# Patient Record
Sex: FEMALE | Race: WHITE | NOT HISPANIC OR LATINO | Employment: OTHER | URBAN - METROPOLITAN AREA
[De-identification: names, ages, dates, MRNs, and addresses within clinical notes are randomized per-mention and may not be internally consistent; named-entity substitution may affect disease eponyms.]

---

## 2017-01-23 RX ORDER — METOPROLOL SUCCINATE 100 MG/1
100 TABLET, EXTENDED RELEASE ORAL DAILY
Qty: 30 TABLET | Refills: 11 | Status: SHIPPED | OUTPATIENT
Start: 2017-01-23 | End: 2017-08-02 | Stop reason: SDUPTHER

## 2017-08-02 ENCOUNTER — OFFICE VISIT (OUTPATIENT)
Dept: CARDIOLOGY | Facility: CLINIC | Age: 72
End: 2017-08-02

## 2017-08-02 VITALS
HEIGHT: 62 IN | WEIGHT: 168.8 LBS | HEART RATE: 64 BPM | BODY MASS INDEX: 31.06 KG/M2 | SYSTOLIC BLOOD PRESSURE: 124 MMHG | DIASTOLIC BLOOD PRESSURE: 80 MMHG

## 2017-08-02 DIAGNOSIS — I10 ESSENTIAL HYPERTENSION: ICD-10-CM

## 2017-08-02 DIAGNOSIS — I47.1 SVT (SUPRAVENTRICULAR TACHYCARDIA) (HCC): Primary | ICD-10-CM

## 2017-08-02 PROCEDURE — 99213 OFFICE O/P EST LOW 20 MIN: CPT | Performed by: INTERNAL MEDICINE

## 2017-08-02 RX ORDER — METOPROLOL SUCCINATE 100 MG/1
100 TABLET, EXTENDED RELEASE ORAL DAILY
Qty: 90 TABLET | Refills: 3 | Status: SHIPPED | OUTPATIENT
Start: 2017-08-02 | End: 2018-08-09 | Stop reason: SDUPTHER

## 2017-08-02 NOTE — PROGRESS NOTES
Ivon Damon  1945  [phone]      08/02/2017    [facility] CARDIOLOGY     Bri Maria, DO  [address] [place] [address]    Chief Complaint   Patient presents with   • Rapid Heart Rate       Problem List:   PROBLEM LIST:  1. SVT:   a. Long-standing history of tachypalpitations.  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   b. Multiple ER visits  with successful cardioversion with adenosine.    Bilateral carotid duplex 3/14/2016: No evidence of stenosis in the right common carotid artery, no hemodynamically significant stenosis in the right internal carotid artery, no evidence of stenosis in the left common carotid artery and no hemodynamically significant stenosis in the left internal carotid artery.  -Echocardiogram 3/14/2016: EF 60-65%, mild aortic cusp sclerosis with no valvular disease noted  -SVT episode 4/2016 ER lasting   Treated with Adenosine at [facility]. HR was 200.      2. HTN  3. Graves’  disease:  a. Status post radiation therapy.  b. Chronic replacement.   4. History of nephrolithiasis.   5. Surgical history:  Partial hysterectomy, 1975.     Allergies  Allergies   Allergen Reactions   • Penicillins    • Sulfa Antibiotics        Current Medications    Current Outpatient Prescriptions:   •  levothyroxine (SYNTHROID, LEVOTHROID) 125 MCG tablet, Take 125 mcg by mouth daily., Disp: , Rfl:   •  metoprolol succinate XL (TOPROL-XL) 100 MG 24 hr tablet, Take 1 tablet by mouth Daily., Disp: 30 tablet, Rfl: 11    History of Present Illness   HPI    Pt presents for follow up of SVT . Since we last saw the pt, pt denies any SVT episodes, SOB, CP, LH, and dizziness. Denies any hospitalizations, ER visits, bleeding, or TIA/CVA symptoms. Overall feels well. BP stable at home.    ROS:  General:  Denies fatigue, weight gain or loss  Cardiovascular:  Denies CP, PND, syncope, near syncope, edema or palpitations.  Pulmonary:  Denies WHITESIDE, cough, or  "wheezing      Vitals:    08/02/17 1128   BP: 124/80   BP Location: Left arm   Patient Position: Sitting   Pulse: 64   Weight: 168 lb 12.8 oz (76.6 kg)   Height: 62\" (157.5 cm)     PE:  General: NAD  Neck: no JVD, no carotid bruits, no TM  Heart RRR, NL S1, S2, S4 present, no rubs, murmurs  Lungs: CTA, no wheezes, rhonchi, or rales  Abd: soft, non-tender, NL BS  Ext: No musculoskeletal deformities, no edema, cyanosis, or clubbing  Psych: normal mood and affect    Diagnostic Data:      Procedures    1. SVT (supraventricular tachycardia)    2. Essential hypertension          Plan:  1) SVT: no recurrence on BBL  Continue present medications.   2) HTN  Wt loss, exercise, salt reduction    F/up in 12 months      "

## 2018-06-27 ENCOUNTER — TELEPHONE (OUTPATIENT)
Dept: CARDIOLOGY | Facility: CLINIC | Age: 73
End: 2018-06-27

## 2018-06-27 DIAGNOSIS — I47.1 SVT (SUPRAVENTRICULAR TACHYCARDIA) (HCC): Primary | ICD-10-CM

## 2018-06-27 NOTE — TELEPHONE ENCOUNTER
"Patient is complainig of \"skipped beats\" and feels like they are stronger and more noticable than they normally have been. She is not complaining of any other symptoms at this time. She wants to know if you want to see her sooner than her appointment in August. Thank you  "

## 2018-07-09 ENCOUNTER — TELEPHONE (OUTPATIENT)
Dept: CARDIOLOGY | Facility: CLINIC | Age: 73
End: 2018-07-09

## 2018-07-10 ENCOUNTER — TELEPHONE (OUTPATIENT)
Dept: CARDIOLOGY | Facility: CLINIC | Age: 73
End: 2018-07-10

## 2018-07-25 ENCOUNTER — TELEPHONE (OUTPATIENT)
Dept: CARDIOLOGY | Facility: CLINIC | Age: 73
End: 2018-07-25

## 2018-07-25 NOTE — TELEPHONE ENCOUNTER
Called pt per María Elena and GFT to let her know rare PAC's and extra beats. Nothing to treat at this time. No answer left voicemail to call the office back.

## 2018-08-09 RX ORDER — METOPROLOL SUCCINATE 100 MG/1
TABLET, EXTENDED RELEASE ORAL
Qty: 30 TABLET | Refills: 11 | Status: SHIPPED | OUTPATIENT
Start: 2018-08-09 | End: 2019-08-23 | Stop reason: SDUPTHER

## 2018-08-29 ENCOUNTER — OFFICE VISIT (OUTPATIENT)
Dept: CARDIOLOGY | Facility: CLINIC | Age: 73
End: 2018-08-29

## 2018-08-29 VITALS
HEART RATE: 72 BPM | WEIGHT: 173 LBS | DIASTOLIC BLOOD PRESSURE: 92 MMHG | BODY MASS INDEX: 31.83 KG/M2 | SYSTOLIC BLOOD PRESSURE: 140 MMHG | HEIGHT: 62 IN | OXYGEN SATURATION: 98 %

## 2018-08-29 DIAGNOSIS — I10 ESSENTIAL HYPERTENSION: ICD-10-CM

## 2018-08-29 DIAGNOSIS — I49.1 PAC (PREMATURE ATRIAL CONTRACTION): Primary | ICD-10-CM

## 2018-08-29 DIAGNOSIS — I47.1 SVT (SUPRAVENTRICULAR TACHYCARDIA) (HCC): ICD-10-CM

## 2018-08-29 DIAGNOSIS — I49.3 PVC (PREMATURE VENTRICULAR CONTRACTION): ICD-10-CM

## 2018-08-29 PROCEDURE — 99213 OFFICE O/P EST LOW 20 MIN: CPT | Performed by: INTERNAL MEDICINE

## 2018-08-29 RX ORDER — LEVOTHYROXINE SODIUM 112 UG/1
112 TABLET ORAL DAILY
COMMUNITY

## 2018-08-29 RX ORDER — BACLOFEN 10 MG/1
10 TABLET ORAL AS NEEDED
COMMUNITY
Start: 2018-06-18 | End: 2019-09-30

## 2018-08-29 NOTE — PROGRESS NOTES
Ivon Damon  1945    There is no work phone number on file.    08/29/2018    [facility] CARDIOLOGY     Perman, MD Nery  [address] [place] [address]    Chief Complaint   Patient presents with   • SVT (supraventricular tachycardia)     PROBLEM LIST:  1. SVT:   a. Long-standing history of tachypalpitations.  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   b. Multiple ER visits  with successful cardioversion with adenosine.   c. SVT episode 4/2016 ER lasting   Treated with Adenosine at [facility]. HR was 200.        d. Echocardiogram 3/14/2016: EF 60-65%, mild aortic cusp sclerosis with no valvular disease noted    e. Bilateral carotid duplex 3/14/2016: No evidence of stenosis in the right common carotid artery, no hemodynamically significant stenosis in the right internal carotid artery, no evidence of stenosis in the left common carotid artery and no hemodynamically significant stenosis in the left internal carotid artery.  -  f. ER 7/18: NSAT, PVC/PAC    -      2. HTN  3. Graves’  disease:  a. Status post radiation therapy.  b. Chronic replacement.   4. History of nephrolithiasis.   5. Surgical history:  Partial hysterectomy, 1975.    Allergies  Allergies   Allergen Reactions   • Penicillins    • Sulfa Antibiotics        Current Medications    Current Outpatient Prescriptions:   •  baclofen (LIORESAL) 10 MG tablet, Take 10 mg by mouth As Needed., Disp: , Rfl:   •  levothyroxine (SYNTHROID) 112 MCG tablet, Take 112 mcg by mouth Daily., Disp: , Rfl:   •  metoprolol succinate XL (TOPROL-XL) 100 MG 24 hr tablet, TAKE ONE TABLET BY MOUTH DAILY, Disp: 30 tablet, Rfl: 11    History of Present Illness   HPI    Pt presents for follow up of SVT. Since we last saw the pt, pt with palps in July lasting seconds noticed at rest better with exertion. ER c/w PVC, PAC, NSAT. Now palps better. Denies any SVT episodes, SOB, CP, LH, and dizziness. Denies any hospitalizations, ER visits,  "bleeding, or TIA/CVA symptoms. Overall feels well. Awaiting routine colonscopy. Does not check BP at home    ROS:  General:  + mild fatigue, No weight gain or loss  Cardiovascular:  Denies CP, PND, syncope, near syncope, edema or palpitations.  Pulmonary:  Denies WHITESIDE, cough, or wheezing      Vitals:    08/29/18 0852   BP: 140/92   BP Location: Right arm   Patient Position: Sitting   Pulse: 72   SpO2: 98%   Weight: 78.5 kg (173 lb)   Height: 157.5 cm (62\")     Body mass index is 31.64 kg/m².  PE:  General: NAD  Neck: no JVD, no carotid bruits, no TM  Heart RRR, NL S1, S2, no rubs, murmurs  Lungs: CTA, no wheezes, rhonchi, or rales  Abd: soft, non-tender, NL BS  Ext: No musculoskeletal deformities, no edema, cyanosis, or clubbing  Psych: normal mood and affect    Diagnostic Data:      Procedures    1. PAC (premature atrial contraction)    2. PVC (premature ventricular contraction)    3. SVT (supraventricular tachycardia) (CMS/HCC)    4. Essential hypertension          Plan:  1) PAC/PVC: improved recently: monitor  2) SVT: no recurrence on BBL  Continue present medications.   3) HTN: would monitor at home  Wt loss, exercise, salt reduction     F/up in 12 months          "

## 2019-08-23 RX ORDER — METOPROLOL SUCCINATE 100 MG/1
100 TABLET, EXTENDED RELEASE ORAL DAILY
Qty: 30 TABLET | Refills: 11 | Status: SHIPPED | OUTPATIENT
Start: 2019-08-23 | End: 2020-07-02

## 2019-09-30 ENCOUNTER — OFFICE VISIT (OUTPATIENT)
Dept: CARDIOLOGY | Facility: CLINIC | Age: 74
End: 2019-09-30

## 2019-09-30 VITALS
HEART RATE: 80 BPM | SYSTOLIC BLOOD PRESSURE: 122 MMHG | DIASTOLIC BLOOD PRESSURE: 78 MMHG | BODY MASS INDEX: 36.91 KG/M2 | WEIGHT: 200.6 LBS | OXYGEN SATURATION: 96 % | HEIGHT: 62 IN

## 2019-09-30 DIAGNOSIS — I10 ESSENTIAL HYPERTENSION: ICD-10-CM

## 2019-09-30 DIAGNOSIS — I47.1 SVT (SUPRAVENTRICULAR TACHYCARDIA) (HCC): Primary | ICD-10-CM

## 2019-09-30 PROCEDURE — 99213 OFFICE O/P EST LOW 20 MIN: CPT | Performed by: PHYSICIAN ASSISTANT

## 2019-09-30 NOTE — PROGRESS NOTES
Ivon Damon  1945  PCP: Josue Vieyra MD    SUBJECTIVE:   Ivon Damon is a 74 y.o. female seen for a follow up visit regarding the following:     Chief Complaint: Follow up for SVT     HPI:    Since last visit the patient's status has been stable. She has had no recurrence of SVT. She only feels occasional hard beats that are very minimal. Overall stable course. No cp, sob, edema.     PROBLEM LIST:  1. SVT:   a. Long-standing history of tachypalpitations.  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   b. Multiple ER visits  with successful cardioversion with adenosine.   c. SVT episode 4/2016 ER lasting   Treated with Adenosine at [facility]. HR was 200.        d. Echocardiogram 3/14/2016: EF 60-65%, mild aortic cusp sclerosis with no valvular disease noted    e. Bilateral carotid duplex 3/14/2016: No evidence of stenosis in the right common carotid artery, no hemodynamically significant stenosis in the right internal carotid artery, no evidence of stenosis in the left common carotid artery and no hemodynamically significant stenosis in the left internal carotid artery.  -  f. ER 7/18: NSAT, PVC/PAC     -      2. HTN  3. Graves’  disease:  a. Status post radiation therapy.  b. Chronic replacement.   4. History of nephrolithiasis.   5. Surgical history:  Partial hysterectomy, 1975.        Current Outpatient Medications:   •  levothyroxine (SYNTHROID) 112 MCG tablet, Take 112 mcg by mouth Daily., Disp: , Rfl:   •  metoprolol succinate XL (TOPROL-XL) 100 MG 24 hr tablet, Take 1 tablet by mouth Daily., Disp: 30 tablet, Rfl: 11    Past Medical History, Past Surgical History, Family history, Social History, and Medications were all reviewed with the patient today and updated as necessary.       Patient Active Problem List   Diagnosis   • Hypertension   • Hyperlipidemia   • SVT (supraventricular tachycardia) (CMS/HCC)   • Graves disease     Allergies   Allergen Reactions   • Penicillins    • Sulfa Antibiotics   "    Past Medical History:   Diagnosis Date   • Abnormal gait    • Carpal tunnel syndrome    • Recurrent falls     x2    • S/P radiation therapy    • Vitamin D deficiency      Past Surgical History:   Procedure Laterality Date   • HUMERUS FRACTURE SURGERY  12/2015    right    • HYSTERECTOMY      partial      Family History   Problem Relation Age of Onset   • Hypertension Mother    • Cancer Mother         malignant neoplasm    • No Known Problems Father    • Other Sister         Cardiac Arrythmia    • No Known Problems Brother      Social History     Tobacco Use   • Smoking status: Never Smoker   • Smokeless tobacco: Never Used   Substance Use Topics   • Alcohol use: No         PHYSICAL EXAM:    /78 (BP Location: Right arm, Patient Position: Sitting)   Pulse 80   Ht 157.5 cm (62\")   Wt 91 kg (200 lb 9.6 oz)   SpO2 96%   BMI 36.69 kg/m²        Wt Readings from Last 5 Encounters:   09/30/19 91 kg (200 lb 9.6 oz)   08/29/18 78.5 kg (173 lb)   08/02/17 76.6 kg (168 lb 12.8 oz)   07/27/16 87.5 kg (193 lb)   03/14/16 87.2 kg (192 lb 4 oz)       BP Readings from Last 5 Encounters:   09/30/19 122/78   08/29/18 140/92   08/02/17 124/80   07/27/16 130/84   03/14/16 138/78       General-Well Nourished, Well developed  Eyes - PERRLA  Neck- supple, No mass  CV- regular rate and rhythm, no MRG, No edema  Lung- clear bilaterally  Abd- soft, +BS  Musc/skel - Norm strength and range of motion  Skin- warm and dry  Neuro - Alert & Oriented x 3, appropriate mood.        Medical problems and test results were reviewed with the patient today.     No results found for this or any previous visit (from the past 672 hour(s)).      EKG: (EKG has been independently visualized by me and summarized below)    Procedures     ASSESSMENT and PLAN    1) PAC/PVC: well controlled on metoprolol.   2) SVT: no recurrence on metoprolol. If recurrence consider EPS.   3) HTN: controlled on Toprol. Wt loss, exercise, salt reduction      Return in " about 1 year (around 9/30/2020).        Blossom Kearns PA-C   Cardiology/Electrophysiology  9/30/2019  2:54 PM

## 2020-07-02 RX ORDER — METOPROLOL SUCCINATE 100 MG/1
TABLET, EXTENDED RELEASE ORAL
Qty: 90 TABLET | Refills: 3 | Status: SHIPPED | OUTPATIENT
Start: 2020-07-02 | End: 2021-06-25

## 2020-11-05 ENCOUNTER — OFFICE VISIT (OUTPATIENT)
Dept: CARDIOLOGY | Facility: CLINIC | Age: 75
End: 2020-11-05

## 2020-11-05 VITALS — HEART RATE: 60 BPM | BODY MASS INDEX: 27.6 KG/M2 | WEIGHT: 150 LBS | HEIGHT: 62 IN

## 2020-11-05 DIAGNOSIS — I47.1 SVT (SUPRAVENTRICULAR TACHYCARDIA) (HCC): Primary | ICD-10-CM

## 2020-11-05 DIAGNOSIS — I10 ESSENTIAL HYPERTENSION: ICD-10-CM

## 2020-11-05 PROCEDURE — 99442 PR PHYS/QHP TELEPHONE EVALUATION 11-20 MIN: CPT | Performed by: PHYSICIAN ASSISTANT

## 2020-11-05 RX ORDER — OMEPRAZOLE 20 MG/1
20 CAPSULE, DELAYED RELEASE ORAL 2 TIMES DAILY
COMMUNITY
End: 2021-12-15

## 2020-11-05 NOTE — PROGRESS NOTES
Ivon Damon  1945  [phone]    11/05/2020    [facility] CARDIOLOGY     Usery, Josue Sanchez MD  [address] [place] [address]    Chief Complaint   Patient presents with   • Rapid Heart Rate       Problem List:     1. SVT:   a. Long-standing history of tachypalpitations.  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   b. Multiple ER visits  with successful cardioversion with adenosine.   c. SVT episode 4/2016 ER lasting   Treated with Adenosine at [facility]. HR was 200.        d. Echocardiogram 3/14/2016: EF 60-65%, mild aortic cusp sclerosis with no valvular disease noted    e. Bilateral carotid duplex 3/14/2016: No evidence of stenosis in the right common carotid artery, no hemodynamically significant stenosis in the right internal carotid artery, no evidence of stenosis in the left common carotid artery and no hemodynamically significant stenosis in the left internal carotid artery.  -  f. ER 7/18: NSAT, PVC/PAC      2. HTN  3. Graves’  disease:  a. Status post radiation therapy.  b. Chronic replacement.   4. History of nephrolithiasis.   5. Surgical history:  Partial hysterectomy, 1975.  6. GERD  7. Basal Cell Carcinoma removal       Allergies  Allergies   Allergen Reactions   • Penicillins    • Sulfa Antibiotics        Current Medications    Current Outpatient Medications:   •  levothyroxine (SYNTHROID) 112 MCG tablet, Take 112 mcg by mouth Daily., Disp: , Rfl:   •  metoprolol succinate XL (TOPROL-XL) 100 MG 24 hr tablet, TAKE ONE TABLET BY MOUTH DAILY, Disp: 90 tablet, Rfl: 3  •  omeprazole (priLOSEC) 20 MG capsule, Take 20 mg by mouth 2 (two) times a day., Disp: , Rfl:     History of Present Illness     Pt presents for follow up of SVT. Since we last saw the pt, she denies any episodes of SVT. She denies SOB, CP, LH, and dizziness. Denies any hospitalizations, ER visits, bleeding issues, or TIA/CVA symptoms. Overall feels well. Had recent colonoscopy and endoscopy due  "to GERD. She had polyp removed. She is on PPI now for GERD. She does not have a BP cuff, but has been to her PCP and told her BP is normal.     ROS:  General:  Denies fatigue, weight gain or loss  Cardiovascular:  Denies CP, PND, syncope, near syncope, edema or palpitations.  Pulmonary:  Denies WHITESIDE, cough, or wheezing      Vitals:    11/05/20 1501   Pulse: 60   Weight: 68 kg (150 lb)   Height: 157.5 cm (62\")     Body mass index is 27.44 kg/m².  PE:  Not performed    Diagnostic Data:      Procedures    1. SVT (supraventricular tachycardia) (CMS/HCC)    2. Essential hypertension          Plan:  1. SVT:  - no recurrences. If recurrence, consider EPS    2. HTN:  - will check BP at PCP next visit.     F/up in 12 months    This patient has consented to a telehealth visit via telephone. The visit was scheduled as a telephone visit to comply with patient safety concerns in accordance with CDC recommendations.  All vitals recorded within this visit are reported by the patient.  I spent 7 minutes in total including but not limited to the 5 minutes spent in direct conversation with this patient.     Electronically signed by LIYAH Macias, 11/05/20, 3:14 PM EST.    "

## 2020-12-04 ENCOUNTER — TELEPHONE (OUTPATIENT)
Dept: CARDIOLOGY | Facility: CLINIC | Age: 75
End: 2020-12-04

## 2020-12-04 NOTE — TELEPHONE ENCOUNTER
Patient called.  Patient had a telehealth visit with you on 11/05/2020.  Patient saw her PCP on 11/10/2020.  Ms. Damon wanted to report to you her B/P was 120/80 at that visit.

## 2021-06-25 RX ORDER — METOPROLOL SUCCINATE 100 MG/1
TABLET, EXTENDED RELEASE ORAL
Qty: 90 TABLET | Refills: 2 | Status: SHIPPED | OUTPATIENT
Start: 2021-06-25 | End: 2022-03-21

## 2021-12-15 ENCOUNTER — OFFICE VISIT (OUTPATIENT)
Dept: CARDIOLOGY | Facility: CLINIC | Age: 76
End: 2021-12-15

## 2021-12-15 VITALS
OXYGEN SATURATION: 97 % | WEIGHT: 160.2 LBS | HEIGHT: 62 IN | SYSTOLIC BLOOD PRESSURE: 130 MMHG | BODY MASS INDEX: 29.48 KG/M2 | HEART RATE: 65 BPM | DIASTOLIC BLOOD PRESSURE: 74 MMHG

## 2021-12-15 DIAGNOSIS — I10 PRIMARY HYPERTENSION: ICD-10-CM

## 2021-12-15 DIAGNOSIS — I47.1 SVT (SUPRAVENTRICULAR TACHYCARDIA) (HCC): Primary | ICD-10-CM

## 2021-12-15 PROCEDURE — 93000 ELECTROCARDIOGRAM COMPLETE: CPT | Performed by: PHYSICIAN ASSISTANT

## 2021-12-15 PROCEDURE — 99213 OFFICE O/P EST LOW 20 MIN: CPT | Performed by: INTERNAL MEDICINE

## 2021-12-15 NOTE — PROGRESS NOTES
Ivon Damon  1945  [phone]    12/15/2021    [facility] CARDIOLOGY     Referring Provider: No ref. provider found     Josue Vieyra MD  [address] [place], [place] [id number]    Chief Complaint   Patient presents with   • Premature Atrial Contraction   • SVT (supraventricular tachycardia)       Problem List:   1. SVT:   a. Long-standing history of tachypalpitations.  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   b. Multiple ER visits  with successful cardioversion with adenosine.   c. SVT episode 4/2016 ER lasting   Treated with Adenosine at [facility]. HR was 200.        d. Echocardiogram 3/14/2016: EF 60-65%, mild aortic cusp sclerosis with no valvular disease noted    e. Bilateral carotid duplex 3/14/2016: No evidence of stenosis in the right common carotid artery, no hemodynamically significant stenosis in the right internal carotid artery, no evidence of stenosis in the left common carotid artery and no hemodynamically significant stenosis in the left internal carotid artery.  f. ER 7/18: NSAT, PVC/PAC  2. HTN  3. GERD  4. Graves’  disease:  a. Status post radiation therapy.  b. Chronic replacement.   5. History of nephrolithiasis.   6. Surgical history:  Partial hysterectomy, 1975.  a. Basal Cell Carcinoma removal     Allergies  Allergies   Allergen Reactions   • Penicillins Unknown - Low Severity   • Sulfa Antibiotics Unknown - Low Severity       Current Medications:     Current Outpatient Medications:   •  levothyroxine (SYNTHROID) 112 MCG tablet, Take 112 mcg by mouth Daily., Disp: , Rfl:   •  metoprolol succinate XL (TOPROL-XL) 100 MG 24 hr tablet, TAKE ONE TABLET BY MOUTH DAILY, Disp: 90 tablet, Rfl: 2    History of Present Illness     Pt presents for follow up of SVT. Since we last saw the pt, she denies any SVT episodes, SOB, CP, LH, and dizziness, syncope. Denies any hospitalizations, ER visits, bleeding issues, or TIA/CVA symptoms. Overall feels well. BP has been  "controlled. Recent bloodwork with PCP revealed slightly elevated TSH and PCP recommended she lower her synthroid.     ROS:  General:  Denies fatigue, weight gain or loss  Cardiovascular:  Denies CP, PND, syncope, near syncope, edema or palpitations.  Pulmonary:  Denies WHITESIDE, cough, or wheezing      Vitals:    12/15/21 1101   BP: 130/74   BP Location: Left arm   Patient Position: Sitting   Cuff Size: Adult   Pulse: 65   SpO2: 97%   Weight: 72.7 kg (160 lb 3.2 oz)   Height: 157.5 cm (62\")     Body mass index is 29.3 kg/m².  PE:  General: NAD  Neck: no JVD, no carotid bruits, no TM  Heart RRR, NL S1, S2, S4 present, no rubs, murmurs  Lungs: CTA, no wheezes, rhonchi, or rales  Abd: soft, non-tender, NL BS  Ext: No musculoskeletal deformities, no edema, cyanosis, or clubbing  Psych: normal mood and affect    Diagnostic Data:        ECG 12 Lead    Date/Time: 12/15/2021 11:13 AM  Performed by: Nallely Giron PA  Authorized by: Nallely Giron PA   Comparison: not compared with previous ECG   Previous ECG: no previous ECG available  Rhythm: sinus rhythm  BPM: 65              1. SVT (supraventricular tachycardia) (HCC)    2. Primary hypertension          Plan:    1. SVT:  - no recurrences. Doing well currently. If recurrence, consider EPS  - agree with decrease in synthroid due to slightly elevated TSH. PCP is watching this.      2. HTN:  - controlled on Metoprolol     F/up in 12 months    Electronically signed by LIYAH Macias, 12/15/21, 11:03 AM EST.    I, Liban Main MD, personally performed the services described in this documentation as scribed by the above named individual in my presence, and it is both accurate and complete.  12/15/2021  11:23 EST    "

## 2022-03-21 RX ORDER — METOPROLOL SUCCINATE 100 MG/1
TABLET, EXTENDED RELEASE ORAL
Qty: 90 TABLET | Refills: 2 | Status: SHIPPED | OUTPATIENT
Start: 2022-03-21 | End: 2022-12-19 | Stop reason: SDUPTHER

## 2022-08-01 ENCOUNTER — TRANSCRIBE ORDERS (OUTPATIENT)
Dept: LAB | Facility: HOSPITAL | Age: 77
End: 2022-08-01

## 2022-08-01 ENCOUNTER — LAB (OUTPATIENT)
Dept: LAB | Facility: HOSPITAL | Age: 77
End: 2022-08-01

## 2022-08-01 DIAGNOSIS — Z00.00 ROUTINE GENERAL MEDICAL EXAMINATION AT A HEALTH CARE FACILITY: Primary | ICD-10-CM

## 2022-08-01 DIAGNOSIS — Z00.00 ROUTINE GENERAL MEDICAL EXAMINATION AT A HEALTH CARE FACILITY: ICD-10-CM

## 2022-08-01 PROCEDURE — 36415 COLL VENOUS BLD VENIPUNCTURE: CPT

## 2022-08-01 PROCEDURE — 84520 ASSAY OF UREA NITROGEN: CPT

## 2022-08-01 PROCEDURE — 82565 ASSAY OF CREATININE: CPT

## 2022-08-02 LAB
BUN SERPL-MCNC: 15 MG/DL (ref 8–23)
CREAT SERPL-MCNC: 0.59 MG/DL (ref 0.57–1)
EGFRCR SERPLBLD CKD-EPI 2021: 94.7 ML/MIN/1.73

## 2022-11-07 ENCOUNTER — TRANSCRIBE ORDERS (OUTPATIENT)
Dept: ADMINISTRATIVE | Facility: HOSPITAL | Age: 77
End: 2022-11-07

## 2022-11-07 DIAGNOSIS — R22.1 LOCALIZED SWELLING, MASS OR LUMP OF NECK: Primary | ICD-10-CM

## 2022-11-21 ENCOUNTER — HOSPITAL ENCOUNTER (OUTPATIENT)
Dept: ULTRASOUND IMAGING | Facility: HOSPITAL | Age: 77
Discharge: HOME | End: 2022-11-21

## 2022-11-21 DIAGNOSIS — R22.1 LOCALIZED SWELLING, MASS OR LUMP OF NECK: ICD-10-CM

## 2022-11-21 PROCEDURE — 76536 US EXAM OF HEAD AND NECK: CPT

## 2022-11-21 RX ORDER — LIDOCAINE HYDROCHLORIDE 10 MG/ML
5 INJECTION, SOLUTION EPIDURAL; INFILTRATION; INTRACAUDAL; PERINEURAL ONCE
Status: DISCONTINUED | OUTPATIENT
Start: 2022-11-21 | End: 2022-11-22 | Stop reason: HOSPADM

## 2022-11-23 LAB — REF LAB TEST METHOD: NORMAL

## 2022-12-19 RX ORDER — METOPROLOL SUCCINATE 100 MG/1
100 TABLET, EXTENDED RELEASE ORAL DAILY
Qty: 30 TABLET | Refills: 0 | Status: SHIPPED | OUTPATIENT
Start: 2022-12-19 | End: 2023-01-16 | Stop reason: SDUPTHER

## 2022-12-21 ENCOUNTER — OFFICE VISIT (OUTPATIENT)
Dept: CARDIOLOGY | Facility: CLINIC | Age: 77
End: 2022-12-21

## 2022-12-21 VITALS
OXYGEN SATURATION: 98 % | DIASTOLIC BLOOD PRESSURE: 72 MMHG | BODY MASS INDEX: 29.96 KG/M2 | HEIGHT: 62 IN | HEART RATE: 65 BPM | WEIGHT: 162.8 LBS | SYSTOLIC BLOOD PRESSURE: 130 MMHG

## 2022-12-21 DIAGNOSIS — R01.1 HEART MURMUR: ICD-10-CM

## 2022-12-21 DIAGNOSIS — I47.1 SVT (SUPRAVENTRICULAR TACHYCARDIA): Primary | ICD-10-CM

## 2022-12-21 DIAGNOSIS — I10 PRIMARY HYPERTENSION: ICD-10-CM

## 2022-12-21 PROCEDURE — 93000 ELECTROCARDIOGRAM COMPLETE: CPT | Performed by: PHYSICIAN ASSISTANT

## 2022-12-21 PROCEDURE — 99213 OFFICE O/P EST LOW 20 MIN: CPT | Performed by: PHYSICIAN ASSISTANT

## 2022-12-21 NOTE — PROGRESS NOTES
Ivon Damon  1945  [phone]    12/21/2022    [facility] CARDIOLOGY MAIN CAMPUS     Referring Provider: No ref. provider found     Carrie Prescott MD  [address] [place], [place] [id number]    Chief Complaint   Patient presents with   • SVT (supraventricular tachycardia   • Hypertension   • Hyperlipidemia       Problem List:   1. SVT:   a. Long-standing history of tachypalpitations.  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   b. Multiple ER visits  with successful cardioversion with adenosine.   c. SVT episode 4/2016 ER lasting   Treated with Adenosine at [facility]. HR was 200.        d. Echocardiogram 3/14/2016: EF 60-65%, mild aortic cusp sclerosis with no valvular disease noted    e. Bilateral carotid duplex 3/14/2016: No evidence of stenosis in the right common carotid artery, no hemodynamically significant stenosis in the right internal carotid artery, no evidence of stenosis in the left common carotid artery and no hemodynamically significant stenosis in the left internal carotid artery.  f. ER 7/18: NSAT, PVC/PAC  2. HTN  3. GERD  4. Graves’  disease:  a. Status post radiation therapy.  b. Chronic replacement.   5. History of nephrolithiasis.   6. Surgical history:  Partial hysterectomy, 1975.  a. Basal Cell Carcinoma removal     Allergies  Allergies   Allergen Reactions   • Penicillins Unknown - Low Severity   • Sulfa Antibiotics Unknown - Low Severity       Current Medications    Current Outpatient Medications:   •  levothyroxine (SYNTHROID, LEVOTHROID) 112 MCG tablet, Take 112 mcg by mouth Daily., Disp: , Rfl:   •  metoprolol succinate XL (TOPROL-XL) 100 MG 24 hr tablet, Take 1 tablet by mouth Daily., Disp: 30 tablet, Rfl: 0    History of Present Illness     Pt presents for follow up of SVT and HTN. Since we last saw the pt, she has not had any prolonged episodes of tachy palpitations. She was found to have a lump on her parotid gland and had negative biopsy. Thyroid is  "well controlled, followed by her PCP. She denies SOB, CP, LH, and dizziness, syncope. Denies any hospitalizations, ER visits, or TIA/CVA symptoms. Overall feels well. BP is well controlled.     ROS:  General:  Denies fatigue, weight gain or loss  Cardiovascular:  Denies CP, PND, syncope, near syncope, edema or palpitations.  Pulmonary:  Denies WHITESIDE, cough, or wheezing      Vitals:    12/21/22 1022   BP: 130/72   BP Location: Left arm   Patient Position: Sitting   Pulse: 65   SpO2: 98%   Weight: 73.8 kg (162 lb 12.8 oz)   Height: 157.5 cm (62\")     Body mass index is 29.78 kg/m².  PE:  General: NAD  Neck: no JVD, no carotid bruits, no TM  Heart RRR, NL S1, S2, S4 present, no rubs, + 1/6 murmur  Lungs: CTA, no wheezes, rhonchi, or rales  Abd: soft, non-tender, NL BS  Ext: No musculoskeletal deformities, no edema, cyanosis, or clubbing  Psych: normal mood and affect    Diagnostic Data:        ECG 12 Lead    Date/Time: 12/21/2022 10:50 AM  Performed by: Nallely Giron PA  Authorized by: Nallely Giron PA   Comparison: compared with previous ECG from 12/15/2021  Similar to previous ECG  Rhythm: sinus rhythm  BPM: 65              1. SVT (supraventricular tachycardia) (HCC)    2. Primary hypertension          Plan:  1. SVT:  - no recurrences. Doing well currently. If recurrence, consider EPS     2. HTN:  - controlled on Metoprolol     3. Murmur:   - check echocardiogram to assess aortic valve as she had aortic sclerosis in 2016 echocardiogram.       F/up in 12 months    Electronically signed by LIYAH Macias, 12/21/22, 10:51 AM EST.    "

## 2023-01-13 ENCOUNTER — TELEPHONE (OUTPATIENT)
Dept: CARDIOLOGY | Facility: CLINIC | Age: 78
End: 2023-01-13
Payer: COMMERCIAL

## 2023-01-16 RX ORDER — METOPROLOL SUCCINATE 100 MG/1
100 TABLET, EXTENDED RELEASE ORAL DAILY
Qty: 30 TABLET | Refills: 11 | Status: SHIPPED | OUTPATIENT
Start: 2023-01-16

## 2023-05-22 ENCOUNTER — TELEPHONE (OUTPATIENT)
Dept: INTERNAL MEDICINE | Facility: CLINIC | Age: 78
End: 2023-05-22
Payer: COMMERCIAL

## 2023-05-22 NOTE — TELEPHONE ENCOUNTER
Caller: Ivon Damon    Relationship: Self    Best call back number: [phone]    What is the best time to reach you: ANYTIME    Who are you requesting to speak with (clinical staff, provider,  specific staff member): CLINICAL STAFF    Do you know the name of the person who called: SELF    What was the call regarding: PATIENT WOULD LIKE TO REESTABLISH CARE WITH DR. JARAMILLO IF POSSIBLE. PATIENT WAS REFFERED BY PATIENT ELBA LANDA.    Do you require a callback: YES

## 2023-07-25 ENCOUNTER — HOSPITAL ENCOUNTER (OUTPATIENT)
Dept: CARDIOLOGY | Facility: HOSPITAL | Age: 78
Discharge: HOME | End: 2023-07-25
Admitting: PHYSICIAN ASSISTANT
Payer: COMMERCIAL

## 2023-07-25 VITALS — HEIGHT: 62 IN | BODY MASS INDEX: 29.94 KG/M2 | WEIGHT: 162.7 LBS

## 2023-07-25 DIAGNOSIS — R01.1 HEART MURMUR: ICD-10-CM

## 2023-07-25 LAB
BH CV ECHO MEAS - AO MAX PG: 19.2 MMHG
BH CV ECHO MEAS - AO MEAN PG: 10.8 MMHG
BH CV ECHO MEAS - AO ROOT DIAM: 3.1 CM
BH CV ECHO MEAS - AO V2 MAX: 219 CM/SEC
BH CV ECHO MEAS - AO V2 VTI: 54.3 CM
BH CV ECHO MEAS - AVA(I,D): 1.63 CM2
BH CV ECHO MEAS - EDV(CUBED): 112.7 ML
BH CV ECHO MEAS - EDV(MOD-SP2): 67 ML
BH CV ECHO MEAS - EDV(MOD-SP4): 77 ML
BH CV ECHO MEAS - EF(MOD-BP): 66 %
BH CV ECHO MEAS - EF(MOD-SP2): 65.7 %
BH CV ECHO MEAS - EF(MOD-SP4): 66.2 %
BH CV ECHO MEAS - ESV(CUBED): 27.3 ML
BH CV ECHO MEAS - ESV(MOD-SP2): 23 ML
BH CV ECHO MEAS - ESV(MOD-SP4): 26 ML
BH CV ECHO MEAS - FS: 37.7 %
BH CV ECHO MEAS - IVS/LVPW: 1.08 CM
BH CV ECHO MEAS - IVSD: 1.04 CM
BH CV ECHO MEAS - LAT PEAK E' VEL: 7.8 CM/SEC
BH CV ECHO MEAS - LV DIASTOLIC VOL/BSA (35-75): 44.1 CM2
BH CV ECHO MEAS - LV MASS(C)D: 172.2 GRAMS
BH CV ECHO MEAS - LV MAX PG: 4.3 MMHG
BH CV ECHO MEAS - LV MEAN PG: 2 MMHG
BH CV ECHO MEAS - LV SYSTOLIC VOL/BSA (12-30): 14.9 CM2
BH CV ECHO MEAS - LV V1 MAX: 103.7 CM/SEC
BH CV ECHO MEAS - LV V1 VTI: 28.2 CM
BH CV ECHO MEAS - LVIDD: 4.8 CM
BH CV ECHO MEAS - LVIDS: 3 CM
BH CV ECHO MEAS - LVOT AREA: 3.1 CM2
BH CV ECHO MEAS - LVOT DIAM: 2 CM
BH CV ECHO MEAS - LVPWD: 0.96 CM
BH CV ECHO MEAS - MED PEAK E' VEL: 7.13 CM/SEC
BH CV ECHO MEAS - MR MAX PG: 40.6 MMHG
BH CV ECHO MEAS - MR MAX VEL: 315.5 CM/SEC
BH CV ECHO MEAS - MR MEAN PG: 27.5 MMHG
BH CV ECHO MEAS - MR MEAN VEL: 241.5 CM/SEC
BH CV ECHO MEAS - MR VTI: 96.9 CM
BH CV ECHO MEAS - MV A MAX VEL: 84.9 CM/SEC
BH CV ECHO MEAS - MV DEC SLOPE: 298 CM/SEC2
BH CV ECHO MEAS - MV DEC TIME: 0.27 MSEC
BH CV ECHO MEAS - MV E MAX VEL: 93.8 CM/SEC
BH CV ECHO MEAS - MV E/A: 1.1
BH CV ECHO MEAS - MV MAX PG: 4.4 MMHG
BH CV ECHO MEAS - MV MEAN PG: 2 MMHG
BH CV ECHO MEAS - MV P1/2T: 98.3 MSEC
BH CV ECHO MEAS - MV V2 VTI: 39.1 CM
BH CV ECHO MEAS - MVA(P1/2T): 2.24 CM2
BH CV ECHO MEAS - MVA(VTI): 2.27 CM2
BH CV ECHO MEAS - PA ACC SLOPE: 583 CM/SEC2
BH CV ECHO MEAS - PA ACC TIME: 0.09 SEC
BH CV ECHO MEAS - RAP SYSTOLE: 3 MMHG
BH CV ECHO MEAS - RVSP: 31.5 MMHG
BH CV ECHO MEAS - SI(MOD-SP2): 25.2 ML/M2
BH CV ECHO MEAS - SI(MOD-SP4): 29.2 ML/M2
BH CV ECHO MEAS - SV(LVOT): 88.6 ML
BH CV ECHO MEAS - SV(MOD-SP2): 44 ML
BH CV ECHO MEAS - SV(MOD-SP4): 51 ML
BH CV ECHO MEAS - TAPSE (>1.6): 2 CM
BH CV ECHO MEAS - TR MAX PG: 28.5 MMHG
BH CV ECHO MEAS - TR MAX VEL: 267 CM/SEC
BH CV ECHO MEASUREMENTS AVERAGE E/E' RATIO: 12.57
BH CV VAS BP LEFT ARM: NORMAL MMHG
BH CV XLRA - RV BASE: 4.1 CM
BH CV XLRA - RV LENGTH: 7 CM
BH CV XLRA - RV MID: 2.2 CM
BH CV XLRA - TDI S': 11.3 CM/SEC
IVRT: 74 MSEC
LEFT ATRIUM VOLUME INDEX: 41.1 ML/M2
LEFT ATRIUM VOLUME: 72 ML
LV EF 2D ECHO EST: 65 %

## 2023-07-25 PROCEDURE — 93306 TTE W/DOPPLER COMPLETE: CPT

## 2023-07-25 PROCEDURE — 93306 TTE W/DOPPLER COMPLETE: CPT | Performed by: INTERNAL MEDICINE

## 2023-07-28 ENCOUNTER — TELEPHONE (OUTPATIENT)
Dept: CARDIOLOGY | Facility: CLINIC | Age: 78
End: 2023-07-28
Payer: COMMERCIAL

## 2023-07-28 NOTE — TELEPHONE ENCOUNTER
I tried to call the patient but she did not answer. I left a message for her to call me back at the office.

## 2023-07-28 NOTE — TELEPHONE ENCOUNTER
----- Message from LIYAH Porter sent at 7/28/2023 10:17 AM EDT -----  I called patient to tell her that her echocardiogram looked good, EF normal, valves are WNL. We can wait on her ZioPatch to clear her for her surgery.

## 2023-08-09 ENCOUNTER — TELEPHONE (OUTPATIENT)
Dept: CARDIOLOGY | Facility: CLINIC | Age: 78
End: 2023-08-09
Payer: COMMERCIAL

## 2023-08-09 NOTE — TELEPHONE ENCOUNTER
Called to discuss holter monitor results and to ask about her symptoms. Left a message. Monitor showed short episodes of nonsustained atrial tachycardia. No afib. She is a low cardiac risk for gallbladder surgery.

## 2023-10-30 ENCOUNTER — PRE-ADMISSION TESTING (OUTPATIENT)
Dept: PREADMISSION TESTING | Facility: HOSPITAL | Age: 78
End: 2023-10-30
Payer: COMMERCIAL

## 2023-10-30 VITALS — WEIGHT: 171.74 LBS | HEIGHT: 62 IN | BODY MASS INDEX: 31.6 KG/M2

## 2023-10-30 LAB
ALBUMIN SERPL-MCNC: 4.3 G/DL (ref 3.5–5.2)
ALBUMIN/GLOB SERPL: 1.7 G/DL
ALP SERPL-CCNC: 83 U/L (ref 39–117)
ALT SERPL W P-5'-P-CCNC: 10 U/L (ref 1–33)
ANION GAP SERPL CALCULATED.3IONS-SCNC: 8 MMOL/L (ref 5–15)
AST SERPL-CCNC: 17 U/L (ref 1–32)
BILIRUB SERPL-MCNC: 0.6 MG/DL (ref 0–1.2)
BUN SERPL-MCNC: 18 MG/DL (ref 8–23)
BUN/CREAT SERPL: 27.7 (ref 7–25)
CALCIUM SPEC-SCNC: 9.2 MG/DL (ref 8.6–10.5)
CHLORIDE SERPL-SCNC: 108 MMOL/L (ref 98–107)
CO2 SERPL-SCNC: 28 MMOL/L (ref 22–29)
CREAT SERPL-MCNC: 0.65 MG/DL (ref 0.57–1)
DEPRECATED RDW RBC AUTO: 40.5 FL (ref 37–54)
EGFRCR SERPLBLD CKD-EPI 2021: 91.4 ML/MIN/1.73
ERYTHROCYTE [DISTWIDTH] IN BLOOD BY AUTOMATED COUNT: 12 % (ref 12.3–15.4)
GLOBULIN UR ELPH-MCNC: 2.6 GM/DL
GLUCOSE SERPL-MCNC: 86 MG/DL (ref 65–99)
HCT VFR BLD AUTO: 42.1 % (ref 34–46.6)
HGB BLD-MCNC: 14 G/DL (ref 12–15.9)
MCH RBC QN AUTO: 30.6 PG (ref 26.6–33)
MCHC RBC AUTO-ENTMCNC: 33.3 G/DL (ref 31.5–35.7)
MCV RBC AUTO: 92.1 FL (ref 79–97)
PLATELET # BLD AUTO: 211 10*3/MM3 (ref 140–450)
PMV BLD AUTO: 9.4 FL (ref 6–12)
POTASSIUM SERPL-SCNC: 4.3 MMOL/L (ref 3.5–5.2)
PROT SERPL-MCNC: 6.9 G/DL (ref 6–8.5)
RBC # BLD AUTO: 4.57 10*6/MM3 (ref 3.77–5.28)
SODIUM SERPL-SCNC: 144 MMOL/L (ref 136–145)
WBC NRBC COR # BLD: 5.26 10*3/MM3 (ref 3.4–10.8)

## 2023-10-30 PROCEDURE — 80053 COMPREHEN METABOLIC PANEL: CPT

## 2023-10-30 PROCEDURE — 93005 ELECTROCARDIOGRAM TRACING: CPT

## 2023-10-30 PROCEDURE — 85027 COMPLETE CBC AUTOMATED: CPT

## 2023-10-30 PROCEDURE — 93010 ELECTROCARDIOGRAM REPORT: CPT | Performed by: INTERNAL MEDICINE

## 2023-10-30 PROCEDURE — 36415 COLL VENOUS BLD VENIPUNCTURE: CPT

## 2023-10-30 NOTE — PAT
An arrival time for procedure was not provided during PAT visit. If patient had any questions or concerns about their arrival time, they were instructed to contact their surgeon/physician.  Additionally, if the patient referred to an arrival time that was acquired from their my chart account, patient was encouraged to verify that time with their surgeon/physician. Arrival times are NOT provided in Pre Admission Testing Department.    Patient viewed general PAT education video as instructed in their preoperative information received from their surgeon.  Patient stated the general PAT education video was viewed in its entirety and survey completed.  Copies of PAT general education handouts (Incentive Spirometry, Meds to Beds Program, Patient Belongings, Pre-op skin preparation instructions, Blood Glucose testing, Visitor policy, Surgery FAQ, Code H) distributed to patient if not printed. Education related to the PAT pass and skin preparation for surgery (if applicable) completed in PAT as a reinforcement to PAT education video. Patient instructed to return PAT pass provided today as well as completed skin preparation sheet (if applicable) on the day of procedure.     Additionally if patient had not viewed video yet but intended to view it at home or in our waiting area, then referred them to the handout with QR code/link provided during PAT visit.  Instructed patient to complete survey after viewing the video in its entirety.  Encouraged patient/family to read PAT general education handouts thoroughly and notify PAT staff with any questions or concerns. Patient verbalized understanding of all information and priority content.    Patient denies any current skin issues.     Patient to apply Chlorhexadine wipes  to surgical area (as instructed) the night before procedure and the AM of procedure. Wipes provided.    Post-Surgery Information Instruction Sheet given to patient during Pre-Admission Testing Visit with verbal  instructions to patient to return with PAT PASS on the day of surgery. Additionally, encouraged patient to review the information provided.    Verified patient previously completed cardiology visit for cardiac risk assessment in preparation for upcoming procedure, completion of 12-lead ECG within six months, and risk assessment letter reviewed. No further interventions required.   CARDIAC CLEARANCE FROM JENNA TERRAZAS ON 8/9/23 ON CHART.

## 2023-10-31 LAB
QT INTERVAL: 406 MS
QTC INTERVAL: 444 MS

## 2023-11-07 ENCOUNTER — HOSPITAL ENCOUNTER (OUTPATIENT)
Facility: HOSPITAL | Age: 78
Setting detail: HOSPITAL OUTPATIENT SURGERY
Discharge: HOME | End: 2023-11-07
Attending: SURGERY | Admitting: SURGERY
Payer: COMMERCIAL

## 2023-11-07 ENCOUNTER — ANESTHESIA EVENT (OUTPATIENT)
Dept: PERIOP | Facility: HOSPITAL | Age: 78
End: 2023-11-07
Payer: COMMERCIAL

## 2023-11-07 ENCOUNTER — ANESTHESIA (OUTPATIENT)
Dept: PERIOP | Facility: HOSPITAL | Age: 78
End: 2023-11-07
Payer: COMMERCIAL

## 2023-11-07 VITALS
TEMPERATURE: 97.9 F | WEIGHT: 171 LBS | HEIGHT: 62 IN | DIASTOLIC BLOOD PRESSURE: 83 MMHG | BODY MASS INDEX: 31.47 KG/M2 | SYSTOLIC BLOOD PRESSURE: 147 MMHG | HEART RATE: 81 BPM | OXYGEN SATURATION: 94 % | RESPIRATION RATE: 18 BRPM

## 2023-11-07 DIAGNOSIS — K80.20 GALLSTONES: Primary | ICD-10-CM

## 2023-11-07 PROCEDURE — 25010000002 SUGAMMADEX 200 MG/2ML SOLUTION: Performed by: ANESTHESIOLOGY

## 2023-11-07 PROCEDURE — 25010000002 DEXAMETHASONE SODIUM PHOSPHATE 10 MG/ML SOLUTION: Performed by: NURSE ANESTHETIST, CERTIFIED REGISTERED

## 2023-11-07 PROCEDURE — 25810000003 LACTATED RINGERS PER 1000 ML: Performed by: ANESTHESIOLOGY

## 2023-11-07 PROCEDURE — 25010000002 HYDROMORPHONE PER 4 MG: Performed by: NURSE ANESTHETIST, CERTIFIED REGISTERED

## 2023-11-07 PROCEDURE — 25010000002 PROPOFOL 10 MG/ML EMULSION: Performed by: NURSE ANESTHETIST, CERTIFIED REGISTERED

## 2023-11-07 PROCEDURE — 25010000002 FENTANYL CITRATE (PF) 50 MCG/ML SOLUTION

## 2023-11-07 PROCEDURE — 88304 TISSUE EXAM BY PATHOLOGIST: CPT | Performed by: SURGERY

## 2023-11-07 PROCEDURE — 25810000003 SODIUM CHLORIDE PER 500 ML: Performed by: SURGERY

## 2023-11-07 PROCEDURE — 25010000002 HYDROMORPHONE 1 MG/ML SOLUTION

## 2023-11-07 PROCEDURE — 25010000002 FENTANYL CITRATE (PF) 100 MCG/2ML SOLUTION: Performed by: NURSE ANESTHETIST, CERTIFIED REGISTERED

## 2023-11-07 PROCEDURE — 25010000002 ONDANSETRON PER 1 MG: Performed by: ANESTHESIOLOGY

## 2023-11-07 PROCEDURE — 25010000002 CEFAZOLIN PER 500 MG: Performed by: SURGERY

## 2023-11-07 DEVICE — LIGAMAX 5 MM ENDOSCOPIC MULTIPLE CLIP APPLIER
Type: IMPLANTABLE DEVICE | Site: ABDOMEN | Status: FUNCTIONAL
Brand: LIGAMAX

## 2023-11-07 RX ORDER — DROPERIDOL 2.5 MG/ML
0.62 INJECTION, SOLUTION INTRAMUSCULAR; INTRAVENOUS
Status: DISCONTINUED | OUTPATIENT
Start: 2023-11-07 | End: 2023-11-07 | Stop reason: HOSPADM

## 2023-11-07 RX ORDER — PROMETHAZINE HYDROCHLORIDE 25 MG/1
25 SUPPOSITORY RECTAL ONCE AS NEEDED
Status: DISCONTINUED | OUTPATIENT
Start: 2023-11-07 | End: 2023-11-07 | Stop reason: HOSPADM

## 2023-11-07 RX ORDER — DEXAMETHASONE SODIUM PHOSPHATE 10 MG/ML
INJECTION, SOLUTION INTRAMUSCULAR; INTRAVENOUS AS NEEDED
Status: DISCONTINUED | OUTPATIENT
Start: 2023-11-07 | End: 2023-11-07 | Stop reason: SURG

## 2023-11-07 RX ORDER — FENTANYL CITRATE 50 UG/ML
INJECTION, SOLUTION INTRAMUSCULAR; INTRAVENOUS AS NEEDED
Status: DISCONTINUED | OUTPATIENT
Start: 2023-11-07 | End: 2023-11-07 | Stop reason: SURG

## 2023-11-07 RX ORDER — ONDANSETRON 2 MG/ML
INJECTION INTRAMUSCULAR; INTRAVENOUS AS NEEDED
Status: DISCONTINUED | OUTPATIENT
Start: 2023-11-07 | End: 2023-11-07 | Stop reason: SURG

## 2023-11-07 RX ORDER — LIDOCAINE HYDROCHLORIDE 10 MG/ML
INJECTION, SOLUTION EPIDURAL; INFILTRATION; INTRACAUDAL; PERINEURAL AS NEEDED
Status: DISCONTINUED | OUTPATIENT
Start: 2023-11-07 | End: 2023-11-07 | Stop reason: SURG

## 2023-11-07 RX ORDER — LABETALOL HYDROCHLORIDE 5 MG/ML
5 INJECTION, SOLUTION INTRAVENOUS
Status: DISCONTINUED | OUTPATIENT
Start: 2023-11-07 | End: 2023-11-07 | Stop reason: HOSPADM

## 2023-11-07 RX ORDER — SODIUM CHLORIDE, SODIUM LACTATE, POTASSIUM CHLORIDE, CALCIUM CHLORIDE 600; 310; 30; 20 MG/100ML; MG/100ML; MG/100ML; MG/100ML
9 INJECTION, SOLUTION INTRAVENOUS CONTINUOUS
Status: DISCONTINUED | OUTPATIENT
Start: 2023-11-07 | End: 2023-11-07 | Stop reason: HOSPADM

## 2023-11-07 RX ORDER — MEPERIDINE HYDROCHLORIDE 25 MG/ML
12.5 INJECTION INTRAMUSCULAR; INTRAVENOUS; SUBCUTANEOUS
Status: DISCONTINUED | OUTPATIENT
Start: 2023-11-07 | End: 2023-11-07 | Stop reason: HOSPADM

## 2023-11-07 RX ORDER — MAGNESIUM HYDROXIDE 1200 MG/15ML
LIQUID ORAL AS NEEDED
Status: DISCONTINUED | OUTPATIENT
Start: 2023-11-07 | End: 2023-11-07 | Stop reason: HOSPADM

## 2023-11-07 RX ORDER — FAMOTIDINE 20 MG/1
20 TABLET, FILM COATED ORAL ONCE
Status: COMPLETED | OUTPATIENT
Start: 2023-11-07 | End: 2023-11-07

## 2023-11-07 RX ORDER — HYDROCODONE BITARTRATE AND ACETAMINOPHEN 5; 325 MG/1; MG/1
1 TABLET ORAL ONCE AS NEEDED
Status: DISCONTINUED | OUTPATIENT
Start: 2023-11-07 | End: 2023-11-07 | Stop reason: HOSPADM

## 2023-11-07 RX ORDER — DROPERIDOL 2.5 MG/ML
0.62 INJECTION, SOLUTION INTRAMUSCULAR; INTRAVENOUS ONCE AS NEEDED
Status: DISCONTINUED | OUTPATIENT
Start: 2023-11-07 | End: 2023-11-07 | Stop reason: HOSPADM

## 2023-11-07 RX ORDER — LIDOCAINE HYDROCHLORIDE 10 MG/ML
0.2 INJECTION, SOLUTION INFILTRATION; PERINEURAL ONCE
Status: COMPLETED | OUTPATIENT
Start: 2023-11-07 | End: 2023-11-07

## 2023-11-07 RX ORDER — PHENYLEPHRINE HCL IN 0.9% NACL 1 MG/10 ML
SYRINGE (ML) INTRAVENOUS AS NEEDED
Status: DISCONTINUED | OUTPATIENT
Start: 2023-11-07 | End: 2023-11-07 | Stop reason: SURG

## 2023-11-07 RX ORDER — SODIUM CHLORIDE 9 MG/ML
INJECTION, SOLUTION INTRAVENOUS AS NEEDED
Status: DISCONTINUED | OUTPATIENT
Start: 2023-11-07 | End: 2023-11-07 | Stop reason: HOSPADM

## 2023-11-07 RX ORDER — PROMETHAZINE HYDROCHLORIDE 25 MG/1
25 TABLET ORAL ONCE AS NEEDED
Status: DISCONTINUED | OUTPATIENT
Start: 2023-11-07 | End: 2023-11-07 | Stop reason: HOSPADM

## 2023-11-07 RX ORDER — HYDROCODONE BITARTRATE AND ACETAMINOPHEN 5; 325 MG/1; MG/1
1 TABLET ORAL EVERY 8 HOURS PRN
Qty: 7 TABLET | Refills: 0 | Status: SHIPPED | OUTPATIENT
Start: 2023-11-07 | End: 2023-11-10

## 2023-11-07 RX ORDER — HYDROMORPHONE HYDROCHLORIDE 1 MG/ML
0.25 INJECTION, SOLUTION INTRAMUSCULAR; INTRAVENOUS; SUBCUTANEOUS
Status: DISCONTINUED | OUTPATIENT
Start: 2023-11-07 | End: 2023-11-07 | Stop reason: HOSPADM

## 2023-11-07 RX ORDER — FENTANYL CITRATE 50 UG/ML
50 INJECTION, SOLUTION INTRAMUSCULAR; INTRAVENOUS
Status: DISCONTINUED | OUTPATIENT
Start: 2023-11-07 | End: 2023-11-07 | Stop reason: HOSPADM

## 2023-11-07 RX ORDER — SODIUM CHLORIDE 0.9 % (FLUSH) 0.9 %
3-10 SYRINGE (ML) INJECTION AS NEEDED
Status: DISCONTINUED | OUTPATIENT
Start: 2023-11-07 | End: 2023-11-07 | Stop reason: HOSPADM

## 2023-11-07 RX ORDER — SODIUM CHLORIDE 0.9 % (FLUSH) 0.9 %
3 SYRINGE (ML) INJECTION EVERY 12 HOURS SCHEDULED
Status: DISCONTINUED | OUTPATIENT
Start: 2023-11-07 | End: 2023-11-07 | Stop reason: HOSPADM

## 2023-11-07 RX ORDER — IPRATROPIUM BROMIDE AND ALBUTEROL SULFATE 2.5; .5 MG/3ML; MG/3ML
3 SOLUTION RESPIRATORY (INHALATION) ONCE AS NEEDED
Status: DISCONTINUED | OUTPATIENT
Start: 2023-11-07 | End: 2023-11-07 | Stop reason: HOSPADM

## 2023-11-07 RX ORDER — NALOXONE HCL 0.4 MG/ML
0.4 VIAL (ML) INJECTION AS NEEDED
Status: DISCONTINUED | OUTPATIENT
Start: 2023-11-07 | End: 2023-11-07 | Stop reason: HOSPADM

## 2023-11-07 RX ORDER — HYDRALAZINE HYDROCHLORIDE 20 MG/ML
5 INJECTION INTRAMUSCULAR; INTRAVENOUS
Status: DISCONTINUED | OUTPATIENT
Start: 2023-11-07 | End: 2023-11-07 | Stop reason: HOSPADM

## 2023-11-07 RX ORDER — SODIUM CHLORIDE 9 MG/ML
40 INJECTION, SOLUTION INTRAVENOUS AS NEEDED
Status: DISCONTINUED | OUTPATIENT
Start: 2023-11-07 | End: 2023-11-07 | Stop reason: HOSPADM

## 2023-11-07 RX ORDER — PROPOFOL 10 MG/ML
VIAL (ML) INTRAVENOUS AS NEEDED
Status: DISCONTINUED | OUTPATIENT
Start: 2023-11-07 | End: 2023-11-07 | Stop reason: SURG

## 2023-11-07 RX ORDER — BUPIVACAINE HYDROCHLORIDE AND EPINEPHRINE 5; 5 MG/ML; UG/ML
INJECTION, SOLUTION PERINEURAL AS NEEDED
Status: DISCONTINUED | OUTPATIENT
Start: 2023-11-07 | End: 2023-11-07 | Stop reason: HOSPADM

## 2023-11-07 RX ORDER — FENTANYL CITRATE 50 UG/ML
INJECTION, SOLUTION INTRAMUSCULAR; INTRAVENOUS
Status: COMPLETED
Start: 2023-11-07 | End: 2023-11-07

## 2023-11-07 RX ORDER — ROCURONIUM BROMIDE 10 MG/ML
INJECTION, SOLUTION INTRAVENOUS AS NEEDED
Status: DISCONTINUED | OUTPATIENT
Start: 2023-11-07 | End: 2023-11-07 | Stop reason: SURG

## 2023-11-07 RX ORDER — HYDROCODONE BITARTRATE AND ACETAMINOPHEN 5; 325 MG/1; MG/1
TABLET ORAL
Status: COMPLETED
Start: 2023-11-07 | End: 2023-11-07

## 2023-11-07 RX ORDER — ONDANSETRON 2 MG/ML
4 INJECTION INTRAMUSCULAR; INTRAVENOUS ONCE AS NEEDED
Status: DISCONTINUED | OUTPATIENT
Start: 2023-11-07 | End: 2023-11-07 | Stop reason: HOSPADM

## 2023-11-07 RX ADMIN — LIDOCAINE HYDROCHLORIDE 50 MG: 10 INJECTION, SOLUTION EPIDURAL; INFILTRATION; INTRACAUDAL; PERINEURAL at 14:59

## 2023-11-07 RX ADMIN — ROCURONIUM BROMIDE 50 MG: 10 SOLUTION INTRAVENOUS at 14:59

## 2023-11-07 RX ADMIN — FAMOTIDINE 20 MG: 20 TABLET, FILM COATED ORAL at 13:23

## 2023-11-07 RX ADMIN — HYDROMORPHONE HYDROCHLORIDE 0.25 MG: 1 INJECTION, SOLUTION INTRAMUSCULAR; INTRAVENOUS; SUBCUTANEOUS at 16:14

## 2023-11-07 RX ADMIN — HYDROCODONE BITARTRATE AND ACETAMINOPHEN 1 TABLET: 5; 325 TABLET ORAL at 17:45

## 2023-11-07 RX ADMIN — SUGAMMADEX 200 MG: 100 INJECTION, SOLUTION INTRAVENOUS at 15:40

## 2023-11-07 RX ADMIN — PROPOFOL 150 MG: 10 INJECTION, EMULSION INTRAVENOUS at 14:59

## 2023-11-07 RX ADMIN — FENTANYL CITRATE 50 MCG: 50 INJECTION, SOLUTION INTRAMUSCULAR; INTRAVENOUS at 14:59

## 2023-11-07 RX ADMIN — ONDANSETRON 4 MG: 2 INJECTION INTRAMUSCULAR; INTRAVENOUS at 15:40

## 2023-11-07 RX ADMIN — Medication 100 MCG: at 15:01

## 2023-11-07 RX ADMIN — FENTANYL CITRATE 50 MCG: 50 INJECTION, SOLUTION INTRAMUSCULAR; INTRAVENOUS at 16:00

## 2023-11-07 RX ADMIN — SODIUM CHLORIDE 2000 MG: 900 INJECTION INTRAVENOUS at 14:53

## 2023-11-07 RX ADMIN — Medication 100 MCG: at 15:19

## 2023-11-07 RX ADMIN — FENTANYL CITRATE 50 MCG: 50 INJECTION, SOLUTION INTRAMUSCULAR; INTRAVENOUS at 14:53

## 2023-11-07 RX ADMIN — LIDOCAINE HYDROCHLORIDE 0.2 ML: 10 INJECTION, SOLUTION EPIDURAL; INFILTRATION; INTRACAUDAL; PERINEURAL at 13:05

## 2023-11-07 RX ADMIN — DEXAMETHASONE SODIUM PHOSPHATE 10 MG: 10 INJECTION, SOLUTION INTRAMUSCULAR; INTRAVENOUS at 14:59

## 2023-11-07 RX ADMIN — SODIUM CHLORIDE, POTASSIUM CHLORIDE, SODIUM LACTATE AND CALCIUM CHLORIDE 9 ML/HR: 600; 310; 30; 20 INJECTION, SOLUTION INTRAVENOUS at 13:05

## 2023-11-07 RX ADMIN — FENTANYL CITRATE 50 MCG: 50 INJECTION, SOLUTION INTRAMUSCULAR; INTRAVENOUS at 16:08

## 2023-11-07 RX ADMIN — HYDROMORPHONE HYDROCHLORIDE 0.25 MG: 1 INJECTION, SOLUTION INTRAMUSCULAR; INTRAVENOUS; SUBCUTANEOUS at 16:29

## 2023-11-07 NOTE — ANESTHESIA PREPROCEDURE EVALUATION
Anesthesia Evaluation                  Airway   Mallampati: I  TM distance: >3 FB  Neck ROM: full  No difficulty expected  Dental      Pulmonary    Cardiovascular     ECG reviewed    (+) hypertension    ROS comment: Echo acceptable    Neuro/Psych  (+) numbness  GI/Hepatic/Renal/Endo    (+) thyroid problem     Musculoskeletal     Abdominal    Substance History      OB/GYN          Other                    Anesthesia Plan    ASA 3     general     intravenous induction     Anesthetic plan, risks, benefits, and alternatives have been provided, discussed and informed consent has been obtained with: patient.    Plan discussed with CRNA.    CODE STATUS:          Additional Anesthesia Volume In Cc (Will Not Render If 0): 0 Type Of Destruction Used: Curettage Depth Of Biopsy: dermis Bill 51940 For Specimen Handling/Conveyance To Laboratory?: no Biopsy Method: double edge Personna blade Dressing: bandage Silver Nitrate Text: The wound bed was treated with silver nitrate after the biopsy was performed. Electrodesiccation Text: The wound bed was treated with electrodesiccation after the biopsy was performed. Billing Type: Third-Party Bill Electrodesiccation And Curettage Text: The wound bed was treated with electrodesiccation and curettage after the biopsy was performed. Wound Care: Petrolatum Post-Care Instructions: I reviewed with the patient in detail post-care instructions. Patient is to keep the biopsy site dry overnight, and then apply bacitracin twice daily until healed. Patient may apply hydrogen peroxide soaks to remove any crusting. Curettage Text: The wound bed was treated with curettage after the biopsy was performed. Was A Bandage Applied: Yes Anesthesia Volume In Cc (Will Not Render If 0): 0.5 Cryotherapy Text: The wound bed was treated with cryotherapy after the biopsy was performed. Hemostasis: Electrocautery and Aluminum Chloride Consent: Written consent was obtained and risks were reviewed including but not limited to scarring, infection, bleeding, scabbing, incomplete removal, nerve damage and allergy to anesthesia. Biopsy Type: H and E Anesthesia Type: 1% lidocaine with epinephrine Notification Instructions: Patient will be notified of biopsy results. However, patient instructed to call the office if not contacted within 2 weeks. Detail Level: Detailed

## 2023-11-07 NOTE — OP NOTE
Operative Note    Ivon Damon  [id number]   1945     Date of Surgery:  11/7/2023    Pre-Operative Diagnosis: Cholelithiasis    Post-Operative Diagnosis: Cholelithiasis    Procedure: Laparoscopic cholecystectomy    Anesthesia:  General          Surgeon:  Noah Sparks MD    Circulator: Yadira Barker RN  Scrub Person: Natalie De Jesus  Nursing Assistant: Claudia Vargas PCT          Estimated Blood Loss: Very minimal    Findings: Multiple large gallstones    Complications: None      Indication for Procedure: Ms. Damon is a 78-year-old lady who presented with complaints of right flank and back pain with work-up remarkable for multiple large gallstones.  HIDA scan showed nonvisualization of the gallbladder.  She presents today for laparoscopic cholecystectomy to alleviate her symptoms.    Procedure: Patient was taken to the operating room by anesthesia and placed supine on the table.  Following induction of general endotracheal anesthesia, SCDs were placed.  She received 2 g of Kefzol IV.  The abdomen was then prepped and draped in a sterile fashion.  Timeout was observed.  Marcaine 0.5% with epinephrine was injected in the infraumbilical region and a small stab incision was made.  The fascia was incised under direct vision to enter the abdominal cavity.  The Cage introducer was advanced and the abdomen insufflated to 15 mmHg using CO2.  The 10 mm scope was advanced and the abdomen inspected.  No gross abnormalities were noted.  The patient was then placed in reverse Trendelenburg position, right side up. Three 5 mm trocars were placed in the right upper quadrant under direct vision.  The gallbladder was mildly distended and relatively long.  The fundus was grasped and pulled over the liver bed.  Minimal adhesions overlying the anterior wall of the gallbladder were taken down with no difficulty to expose the infundibulum which was pulled inferiorly and laterally.  With gentle dissection the cystic duct  and artery were well identified.  The cystic duct was not dilated.  The junction of the common bile duct was noted.  The cystic duct was then clipped with multiple clips and transected as well as the cystic artery.  The gallbladder was then removed off the liver bed with cautery, placed in an Endo Catch bag and delivered out of the abdomen through the umbilical port intact and sent to pathology.  The liver bed was inspected and was noted to be under adequate hemostasis.  The clips were intact with no evidence of bile leak or bleeding noted.  The trocars were then removed under direct vision with no bleeding encountered.  The abdomen was deflated.  The umbilical fascia was approximated with 0 Vicryl suture and the skin incisions were approximated with 4-0 Monocryl subcuticular suture.  Steri-Strips and sterile dressing was applied.  The patient tolerated the procedure well with no complications.  She was extubated and taken to the recovery room in a stable condition.  Sponge count and needle count were correct at the end of the procedure.            Noah Sparks MD  11/07/23  15:56 EST

## 2023-11-07 NOTE — BRIEF OP NOTE
CHOLECYSTECTOMY LAPAROSCOPIC  Progress Note    Ivon Damon  11/7/2023    Pre-op Diagnosis:   Cholelithiasis       Post-Op Diagnosis Codes:     * Cholelithiasis [K80.20]    Procedure/CPT® Codes:        Procedure(s):  CHOLECYSTECTOMY LAPAROSCOPIC              Surgeon(s):  Noah Sparks MD    Anesthesia: General    Staff:   Circulator: Yadira Barker RN  Scrub Person: Natalie De Jesus  Nursing Assistant: Claudia Vargas PCT         Estimated Blood Loss: minimal    Urine Voided: * No values recorded between 11/7/2023  2:51 PM and 11/7/2023  3:51 PM *    Specimens:                Specimens       ID Source Type Tests Collected By Collected At Frozen?    A Gallbladder Tissue TISSUE PATHOLOGY EXAM   Noah Sparks MD 11/7/23 1519 No                  Drains: * No LDAs found *    Findings: Multiple large gallstones        Complications: None          Noah Sparks MD     Date: 11/7/2023  Time: 15:51 EST

## 2023-11-07 NOTE — ANESTHESIA PROCEDURE NOTES
Airway  Urgency: elective    Date/Time: 11/7/2023 3:00 PM  Airway not difficult    General Information and Staff    Patient location during procedure: OR  CRNA/CAA: Danielle Dyson CRNA    Indications and Patient Condition  Indications for airway management: airway protection    Preoxygenated: yes  MILS not maintained throughout  Mask difficulty assessment: 1 - vent by mask    Final Airway Details  Final airway type: endotracheal airway      Successful airway: ETT  Cuffed: yes   Successful intubation technique: direct laryngoscopy  Endotracheal tube insertion site: oral  Blade: Liz  Blade size: 3  ETT size (mm): 7.0  Cormack-Lehane Classification: grade I - full view of glottis  Placement verified by: chest auscultation and capnometry   Measured from: lips  ETT/EBT  to lips (cm): 20  Number of attempts at approach: 1  Assessment: lips, teeth, and gum same as pre-op and atraumatic intubation    Additional Comments  Negative epigastric sounds, Breath sound equal bilaterally with symmetric chest rise and fall

## 2023-11-07 NOTE — ANESTHESIA POSTPROCEDURE EVALUATION
Patient: Ivon Damon    Procedure Summary       Date: 11/07/23 Room / Location:  [facility] /  [facility]    Anesthesia Start: 1451 Anesthesia Stop: 1558    Procedure: CHOLECYSTECTOMY LAPAROSCOPIC (Abdomen) Diagnosis: Cholelithiasis    Surgeons: Noah Sparks MD Provider: Kirk Smith MD    Anesthesia Type: general ASA Status: 3            Anesthesia Type: general    Vitals  Vitals Value Taken Time   /69 11/07/23 1558   Temp 97.9 °F (36.6 °C) 11/07/23 1558   Pulse 88 11/07/23 1558   Resp     SpO2 98 % 11/07/23 1558           Post Anesthesia Care and Evaluation    Patient location during evaluation: PACU  Patient participation: complete - patient participated  Level of consciousness: awake and alert  Pain management: adequate    Airway patency: patent  Anesthetic complications: No anesthetic complications  PONV Status: none  Cardiovascular status: hemodynamically stable and acceptable  Respiratory status: nonlabored ventilation, acceptable and nasal cannula  Hydration status: acceptable

## 2023-11-07 NOTE — H&P
"  Pre-Op H&P  Ivon Damon  [id number]  1945      Chief complaint: Gallstones      Subjective:  Patient is a 78 y.o.female presents for scheduled surgery by Dr. GUNN. She anticipates a CHOLECYSTECTOMY LAPAROSCOPIC  today. She reports intermittent RUQ pain, flatulence, bloating and nausea over the last 5 years. Symptoms exacerbated by fatty foods. She denies changes in bowel habits.      Review of Systems:  Constitutional-- No fever, chills or sweats. No fatigue.  CV-- No chest pain, palpitation or syncope. +HTN  Resp-- No SOB, cough, hemoptysis  Skin--No rashes or lesions      Allergies:   Allergies   Allergen Reactions    Penicillins Rash    Sulfa Antibiotics Rash         Home Meds:  Medications Prior to Admission   Medication Sig Dispense Refill Last Dose    levothyroxine (SYNTHROID, LEVOTHROID) 112 MCG tablet Take 1 tablet by mouth Daily.   11/6/2023    metoprolol succinate XL (TOPROL-XL) 100 MG 24 hr tablet Take 1 tablet by mouth Daily. 30 tablet 11 11/6/2023 at 2100         PMH:   Past Medical History:   Diagnosis Date    Arthritis of both knees     S/P steriod injections bilateral    Carpal tunnel syndrome     Disease of thyroid gland     Graves disease     S/P radiation therapy     SVT (supraventricular tachycardia)     Vitamin D deficiency      PSH:    Past Surgical History:   Procedure Laterality Date    BASAL CELL CARCINOMA EXCISION  2019    nose    CATARACT EXTRACTION, BILATERAL      5/2019    COLONOSCOPY      HUMERUS FRACTURE SURGERY  12/2015    right     HYSTERECTOMY      partial      Social History:   Tobacco:   Social History     Tobacco Use   Smoking Status Never    Passive exposure: Past   Smokeless Tobacco Never      Alcohol:     Social History     Substance and Sexual Activity   Alcohol Use No         Physical Exam:BP (!) 185/89 (BP Location: Right arm)   Pulse 75   Temp 97.1 °F (36.2 °C) (Temporal)   Resp 18   Ht 157.5 cm (62\")   Wt 77.6 kg (171 lb)   LMP  (LMP Unknown)   SpO2 98%   " BMI 31.28 kg/m²       General Appearance:    Alert, cooperative, no distress, appears stated age   Head:    Normocephalic, without obvious abnormality, atraumatic   Lungs:     Clear to auscultation bilaterally, respirations unlabored    Heart:   Regular rate and rhythm, S1 and S2 normal    Abdomen:    Soft without tenderness   Extremities:   Extremities normal, atraumatic, no cyanosis or edema   Skin:   Skin color, texture, turgor normal, no rashes or lesions   Neurologic:   Grossly intact     Results Review:     LABS:  Lab Results   Component Value Date    WBC 5.26 10/30/2023    HGB 14.0 10/30/2023    HCT 42.1 10/30/2023    MCV 92.1 10/30/2023     10/30/2023    NEUTROABS 7.27 12/19/2015    GLUCOSE 86 10/30/2023    BUN 18 10/30/2023    CREATININE 0.65 10/30/2023     10/30/2023    K 4.3 10/30/2023     (H) 10/30/2023    CO2 28.0 10/30/2023    CALCIUM 9.2 10/30/2023    ALBUMIN 4.3 10/30/2023    AST 17 10/30/2023    ALT 10 10/30/2023    BILITOT 0.6 10/30/2023       RADIOLOGY:  Imaging Results (Last 72 Hours)       ** No results found for the last 72 hours. **            I reviewed the patient's new clinical results.    Cancer Staging (if applicable)  Cancer Patient: __ yes _x_no __unknown; If yes, clinical stage T:__ N:__M:__, stage group or __N/A      Impression: Calculus of gallbladder without cholecystitis without obstruction      Plan: CHOLECYSTECTOMY LAPAROSCOPIC       Kelly Dipak, APRN   11/7/2023   13:24 EST

## 2023-11-09 LAB
CYTO UR: NORMAL
LAB AP CASE REPORT: NORMAL
LAB AP CLINICAL INFORMATION: NORMAL
PATH REPORT.FINAL DX SPEC: NORMAL
PATH REPORT.GROSS SPEC: NORMAL

## 2024-01-09 ENCOUNTER — TELEPHONE (OUTPATIENT)
Dept: CARDIOLOGY | Facility: CLINIC | Age: 79
End: 2024-01-09
Payer: COMMERCIAL

## 2024-01-09 NOTE — TELEPHONE ENCOUNTER
Patient called and left a message. I tried to call her back but she did not answer. I left a message for her to call us back at the office.

## 2024-01-10 ENCOUNTER — OFFICE VISIT (OUTPATIENT)
Dept: CARDIOLOGY | Facility: CLINIC | Age: 79
End: 2024-01-10
Payer: COMMERCIAL

## 2024-01-10 VITALS
DIASTOLIC BLOOD PRESSURE: 74 MMHG | HEIGHT: 62 IN | OXYGEN SATURATION: 97 % | WEIGHT: 169.2 LBS | SYSTOLIC BLOOD PRESSURE: 132 MMHG | BODY MASS INDEX: 31.14 KG/M2 | HEART RATE: 78 BPM

## 2024-01-10 DIAGNOSIS — I10 PRIMARY HYPERTENSION: ICD-10-CM

## 2024-01-10 DIAGNOSIS — I47.10 SVT (SUPRAVENTRICULAR TACHYCARDIA): Primary | ICD-10-CM

## 2024-01-10 RX ORDER — METOPROLOL SUCCINATE 100 MG/1
100 TABLET, EXTENDED RELEASE ORAL DAILY
Qty: 90 TABLET | Refills: 3 | Status: SHIPPED | OUTPATIENT
Start: 2024-01-10

## 2024-01-10 NOTE — PROGRESS NOTES
Ivon Damon  1945  [phone]    01/10/2024    [facility] CARDIOLOGY     Referring Provider: No ref. provider found     Josue Vieyra MD  [address] [place], [place] [address]    Chief Complaint   Patient presents with    SVT (supraventricular tachycardia)         Problem List:   SVT:   Long-standing history of tachypalpitations.  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   Multiple ER visits  with successful cardioversion with adenosine.   SVT episode 4/2016 ER lasting   Treated with Adenosine at [facility]. HR was 200.        Echocardiogram 3/14/2016: EF 60-65%, mild aortic cusp sclerosis with no valvular disease noted    Bilateral carotid duplex 3/14/2016: No evidence of stenosis in the right common carotid artery, no hemodynamically significant stenosis in the right internal carotid artery, no evidence of stenosis in the left common carotid artery and no hemodynamically significant stenosis in the left internal carotid artery.  ER 7/18: NSAT, PVC/PAC  Echocardiogram 7/25/2023: EF 65%, mild AR, no significant stenosis.   ZioPatch 7/13-7/20/23: NSR, NSAT, longest 16 beats, fastest 167 bpm  HTN  GERD  Graves’  disease:  Status post radiation therapy.  Chronic replacement.   History of nephrolithiasis.   Surgical history:  Partial hysterectomy, 1975.  Basal Cell Carcinoma removal   Cholecystectomy     Allergies  Allergies   Allergen Reactions    Penicillins Rash    Sulfa Antibiotics Rash       Current Medications    Current Outpatient Medications:     levothyroxine (SYNTHROID, LEVOTHROID) 112 MCG tablet, Take 1 tablet by mouth Daily., Disp: , Rfl:     metoprolol succinate XL (TOPROL-XL) 100 MG 24 hr tablet, Take 1 tablet by mouth Daily., Disp: 30 tablet, Rfl: 11    History of Present Illness:      Pt presents for follow up of SVT and HTN. Since we last saw the pt, pt denies any AF episodes, SOB, CP, syncope. She has rare dizziness.  Denies any hospitalizations, ER visits, bleeding,  "or TIA/CVA symptoms. Overall feels well. She had her gallbladder out recently and did well. She does not check her BP at home.     ROS:  General:  Denies fatigue, weight gain or loss  Cardiovascular:  Denies CP, PND, syncope, near syncope, edema or palpitations.  Pulmonary:  Denies WHITESIDE, cough, or wheezing      Vitals:    01/10/24 1128   BP: 132/74   BP Location: Left arm   Patient Position: Sitting   Cuff Size: Adult   Pulse: 78   SpO2: 97%   Weight: 76.7 kg (169 lb 3.2 oz)   Height: 157.5 cm (62.01\")     Body mass index is 30.94 kg/m².  PE:  General: NAD  Neck: no JVD, no carotid bruits, no TM  Heart RRR, NL S1, S2, S4 present, no rubs, + 1/6 LUSB murmur  Lungs: CTA, no wheezes, rhonchi, or rales  Abd: soft, non-tender, NL BS  Ext: No musculoskeletal deformities, no edema, cyanosis, or clubbing  Psych: normal mood and affect    Diagnostic Data:      Procedures        1. SVT (supraventricular tachycardia)    2. Primary hypertension          Plan:  1. SVT:  - no recurrences. Doing well currently. If recurrence, consider EPS     2. HTN:  - controlled on Metoprolol          F/up in 12 months    Electronically signed by LIYAH Macias, 01/10/24, 11:49 AM EST.    "

## 2024-01-31 ENCOUNTER — TRANSCRIBE ORDERS (OUTPATIENT)
Dept: ADMINISTRATIVE | Facility: HOSPITAL | Age: 79
End: 2024-01-31
Payer: COMMERCIAL

## 2024-01-31 DIAGNOSIS — Z12.31 ENCOUNTER FOR SCREENING MAMMOGRAM FOR BREAST CANCER: Primary | ICD-10-CM

## 2024-02-05 ENCOUNTER — HOSPITAL ENCOUNTER (OUTPATIENT)
Dept: MAMMOGRAPHY | Facility: HOSPITAL | Age: 79
Discharge: HOME | End: 2024-02-05
Payer: COMMERCIAL

## 2024-02-05 ENCOUNTER — APPOINTMENT (OUTPATIENT)
Dept: OTHER | Facility: HOSPITAL | Age: 79
End: 2024-02-05
Payer: COMMERCIAL

## 2024-02-05 DIAGNOSIS — Z12.31 ENCOUNTER FOR SCREENING MAMMOGRAM FOR BREAST CANCER: ICD-10-CM

## 2024-02-05 PROCEDURE — 77067 SCR MAMMO BI INCL CAD: CPT

## 2024-02-05 PROCEDURE — 77063 BREAST TOMOSYNTHESIS BI: CPT

## 2025-01-07 RX ORDER — METOPROLOL SUCCINATE 100 MG/1
100 TABLET, EXTENDED RELEASE ORAL DAILY
Qty: 90 TABLET | Refills: 3 | Status: SHIPPED | OUTPATIENT
Start: 2025-01-07

## 2025-01-09 ENCOUNTER — TELEPHONE (OUTPATIENT)
Dept: CARDIOLOGY | Facility: CLINIC | Age: 80
End: 2025-01-09
Payer: COMMERCIAL

## 2025-01-09 NOTE — TELEPHONE ENCOUNTER
I called to f/u with the patient. She states that yesterday she started having palpitations, feeling weak and fatigued. Her HR is 60 bpm. She has not been checking her BP regularly.    She is going to call me back in the morning and let me know if she can have someone take her to have an EKG done.

## 2025-01-09 NOTE — TELEPHONE ENCOUNTER
Caller: Ivon Damon     Relationship: SELF    Best call back number: [phone]    What is your medical concern? PT SAID THAT SHE FEELS AS IF HER HEART IS SKIPPING BEATS. SHE ALSO SAID SHE FEELS SHORT OF BREATH FROM TIME TO TIME. SHE SAID THIS HAS BEEN GOING ON FOR SEVERAL DAYS AND SHE IS BECOMING CONCERNED. HUB WILL ATTEMPT TO WARM TRANSFER.

## 2025-01-13 ENCOUNTER — TELEPHONE (OUTPATIENT)
Dept: CARDIOLOGY | Facility: CLINIC | Age: 80
End: 2025-01-13
Payer: COMMERCIAL

## 2025-01-13 DIAGNOSIS — I47.10 SVT (SUPRAVENTRICULAR TACHYCARDIA): Primary | ICD-10-CM

## 2025-01-13 NOTE — TELEPHONE ENCOUNTER
Patient has an appointment with Dr. Gottlieb at [facility] this morning. She would like me to fax the EKG order to him so she can have an EKG done today as well.    I faxed the order to [phone].    Patient notified and aware.

## 2025-01-13 NOTE — TELEPHONE ENCOUNTER
Caller: Ivon Damon     Relationship: SELF     Best call back number: [phone]    What is your medical concern? PT WAS TOLD TO CALL THE OFFICE WHEN SHE WAS ABLE TO GET OUT AND GO TO PCP. SHE NEEDS AN ORDER FOR AN EKG CALLED OVER. HER PCP IS WITH [facility] [place]. SHE IS SCHEDULED WITH THEM AT 10:45 THIS MORNING.

## 2025-01-14 NOTE — PROGRESS NOTES
Ivon Damon  1945  [phone]    01/15/2025    [facility] CARDIOLOGY     Referring Provider: No ref. provider found     Josue Vieyra MD  [address] [place], [place] [id number]    Chief Complaint   Patient presents with    SVT (supraventricular tachycardia)       Problem List:   SVT:   Long-standing history of tachypalpitations.  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   Multiple ER visits  with successful cardioversion with adenosine.   SVT episode 2016 ER lasting   Treated with Adenosine at [facility]. HR was 200.        Echocardiogram 3/14/2016: EF 60-65%, mild aortic cusp sclerosis with no valvular disease noted    Bilateral carotid duplex 3/14/2016: No evidence of stenosis in the right common carotid artery, no hemodynamically significant stenosis in the right internal carotid artery, no evidence of stenosis in the left common carotid artery and no hemodynamically significant stenosis in the left internal carotid artery.  ER : NSAT, PVC/PAC  Echocardiogram 2023: EF 65%, mild AR, no significant stenosis.   ZioPatch -23: NSR, NSAT, longest 16 beats, fastest 167 bpm  HTN  GERD  Graves’  disease:  Status post radiation therapy.  Chronic replacement.   History of nephrolithiasis.   Surgical history:  Partial hysterectomy, .  Basal Cell Carcinoma removal   Cholecystectomy       Allergies  Allergies   Allergen Reactions    Penicillins Rash    Sulfa Antibiotics Rash       Current Medications    Current Outpatient Medications:     levothyroxine (SYNTHROID, LEVOTHROID) 112 MCG tablet, Take 1 tablet by mouth Daily., Disp: , Rfl:     metoprolol succinate XL (TOPROL-XL) 100 MG 24 hr tablet, TAKE 1 TABLET BY MOUTH DAILY, Disp: 90 tablet, Rfl: 3    diazePAM (VALIUM) 2 MG tablet, , Disp: , Rfl:     History of Present Illness     Pt presents for follow up of SVT and HTN. Since we last saw the pt, she states her son  a few months ago. She in process of trying to move  to [place] to be close to her daughter. She was very anxious last week with the snow storm and was having more palpitations, with skipped beats. She had EMS come out and had an EKG which was normal. EMS recommended she see PCP for anxiety, and her PCP has prescribed her something for anxiety. She had an EKG on 1/13/2025 at her PCP. She is very anxious today. She has had some atypical chest pain, which hurts worse when she pushes on her chest. She also has some burning in her chest when she takes her pills or eats a large meal. She denies any SVT episodes. She denies SOB, syncope.  No CVA like symptoms. She has lost 20 lbs recently and wonders if she is on too much medication now. She says her legs are shaky.       Vitals:    01/15/25 1116   BP: 130/74   BP Location: Right arm   Patient Position: Sitting   Pulse: 58   SpO2: 99%   Weight: 70.5 kg (155 lb 8 oz)     Body mass index is 28.43 kg/m².  PE:  General: NAD  Neck: no JVD, no carotid bruits, no TM  Heart RRR, NL S1, S2, S4 present, no rubs, + AS murmurs  Lungs: CTA, no wheezes, rhonchi, or rales  Abd: soft, non-tender, NL BS  Ext: No musculoskeletal deformities, no edema, cyanosis, or clubbing  Psych: normal mood and affect    Diagnostic Data:        ECG 12 Lead    Date/Time: 1/15/2025 1:10 PM  Performed by: Nallely Giron PA    Authorized by: Nallely Giron PA  Comparison: compared with previous ECG from 1/13/2025  Similar to previous ECG  Rhythm: sinus rhythm  BPM: 62                   1. SVT (supraventricular tachycardia)    2. Chest pain, atypical          Plan:  1. SVT/Palpitations:  - no recurrences of SVT, but she is having a lot more palpitations in setting of stress. Most likely PVCs, but will do a monitor for 2 weeks for further assessment   - continue Metoprolol 100 mg daily for now, we did discuss reducing Metoprolol as she has lost some weight and is slightly bradycardic today, however, she wants to stay on same dose for now.      2. Atypical  CP:  -I suspect her chest pain is likely musculoskeletal and also may have some GI component however she seems very worried about this.  EKG today is not concerning.  I will order an echocardiogram due to murmur I heard on her exam today and I offered stress test however she wants to hold off on this for now.  She knows to call us if she has further chest discomfort I would like to pursue stress test at this time.    F/up in 6 months    Electronically signed by LIYAH Macias, 01/15/25, 1:14 PM EST.

## 2025-01-15 ENCOUNTER — OFFICE VISIT (OUTPATIENT)
Dept: CARDIOLOGY | Facility: CLINIC | Age: 80
End: 2025-01-15
Payer: COMMERCIAL

## 2025-01-15 VITALS
HEART RATE: 58 BPM | BODY MASS INDEX: 28.43 KG/M2 | OXYGEN SATURATION: 99 % | WEIGHT: 155.5 LBS | SYSTOLIC BLOOD PRESSURE: 130 MMHG | DIASTOLIC BLOOD PRESSURE: 74 MMHG

## 2025-01-15 DIAGNOSIS — I10 PRIMARY HYPERTENSION: ICD-10-CM

## 2025-01-15 DIAGNOSIS — I47.10 SVT (SUPRAVENTRICULAR TACHYCARDIA): Primary | ICD-10-CM

## 2025-01-15 DIAGNOSIS — R07.89 CHEST PAIN, ATYPICAL: ICD-10-CM

## 2025-01-15 PROCEDURE — 93000 ELECTROCARDIOGRAM COMPLETE: CPT | Performed by: PHYSICIAN ASSISTANT

## 2025-01-15 PROCEDURE — 99214 OFFICE O/P EST MOD 30 MIN: CPT | Performed by: PHYSICIAN ASSISTANT

## 2025-01-15 RX ORDER — DIAZEPAM 2 MG/1
TABLET ORAL
COMMUNITY
Start: 2025-01-13

## 2025-01-22 ENCOUNTER — TELEPHONE (OUTPATIENT)
Dept: CARDIOLOGY | Facility: CLINIC | Age: 80
End: 2025-01-22
Payer: COMMERCIAL

## 2025-01-22 DIAGNOSIS — R07.9 CHEST PAIN, UNSPECIFIED TYPE: ICD-10-CM

## 2025-01-22 DIAGNOSIS — E78.2 MODERATE MIXED HYPERLIPIDEMIA NOT REQUIRING STATIN THERAPY: ICD-10-CM

## 2025-01-22 DIAGNOSIS — I47.10 SVT (SUPRAVENTRICULAR TACHYCARDIA): ICD-10-CM

## 2025-01-22 DIAGNOSIS — I10 PRIMARY HYPERTENSION: Primary | ICD-10-CM

## 2025-01-22 NOTE — TELEPHONE ENCOUNTER
Patient calling today to follow up from 1/15 visit with LIYAH Ngo.  Patient reports having continuing symptoms, although the palpations have lightened up. She cannot get echo done until 2/20 per scheduling, but is currently wearing 2-week zip patch.  Pt concerned about treatment moving forward and very anxious.

## 2025-01-24 NOTE — TELEPHONE ENCOUNTER
Patient states that she is still having a burning sensation in the center of her chest and a dull ache on the left side of her chest. She would like to have a stress test done.

## 2025-01-24 NOTE — TELEPHONE ENCOUNTER
We will see what the ZioPatch shows. If she is still having chest pain, we were going to order a stress test. Does she want to do this?

## 2025-02-05 LAB
CV ZIO BASELINE AVG BPM: 64 BPM
CV ZIO BASELINE BPM HIGH: 143 BPM
CV ZIO BASELINE BPM LOW: 44 BPM
CV ZIO DEVICE ANALYSIS TIME: NORMAL
CV ZIO ECT SVE COUNT: 940 EPISODES
CV ZIO ECT SVE CPLT COUNT: 81 EPISODES
CV ZIO ECT SVE CPLT FREQ: NORMAL
CV ZIO ECT SVE FREQ: NORMAL
CV ZIO ECT SVE TPLT COUNT: 16 EPISODES
CV ZIO ECT SVE TPLT FREQ: NORMAL
CV ZIO ECT VE COUNT: 4528 EPISODES
CV ZIO ECT VE CPLT COUNT: 0 EPISODES
CV ZIO ECT VE CPLT FREQ: 0
CV ZIO ECT VE FREQ: NORMAL
CV ZIO ECT VE TPLT COUNT: 0 EPISODES
CV ZIO ECT VE TPLT FREQ: 0
CV ZIO ECTOPIC SVE COUPLET RAW PERCENT: 0.02 %
CV ZIO ECTOPIC SVE ISOLATED PERCENT: 0.1 %
CV ZIO ECTOPIC SVE TRIPLET RAW PERCENT: 0.01 %
CV ZIO ECTOPIC VE COUPLET RAW PERCENT: 0 %
CV ZIO ECTOPIC VE ISOLATED PERCENT: 0.47 %
CV ZIO ECTOPIC VE TRIPLET RAW PERCENT: 0 %
CV ZIO ENROLLMENT END: NORMAL
CV ZIO ENROLLMENT START: NORMAL
CV ZIO PATIENT EVENTS DIARIES: 5
CV ZIO PATIENT EVENTS TRIGGERS: 9
CV ZIO PAUSE COUNT: 0
CV ZIO PRESCRIPTION STATUS: NORMAL
CV ZIO SVT AVG BPM: 110 BPM
CV ZIO SVT BPM HIGH: 143 BPM
CV ZIO SVT BPM LOW: 74 BPM
CV ZIO SVT COUNT: 14
CV ZIO SVT F EPI AVG BPM: 125 BPM
CV ZIO SVT F EPI BEATS: 27 BEATS
CV ZIO SVT F EPI BPM HIGH: 143 BPM
CV ZIO SVT F EPI BPM LOW: 102 BPM
CV ZIO SVT F EPI DUR: 13.4 SEC
CV ZIO SVT F EPI END: NORMAL
CV ZIO SVT F EPI START: NORMAL
CV ZIO SVT L EPI AVG BPM: 100 BPM
CV ZIO SVT L EPI BEATS: 25 BEATS
CV ZIO SVT L EPI BPM HIGH: 121 BPM
CV ZIO SVT L EPI BPM LOW: 88 BPM
CV ZIO SVT L EPI DUR: 15 SEC
CV ZIO SVT L EPI END: NORMAL
CV ZIO SVT L EPI START: NORMAL
CV ZIO TOTAL  ENROLLMENT PERIOD: NORMAL
CV ZIO VT COUNT: 0

## 2025-02-07 ENCOUNTER — TELEPHONE (OUTPATIENT)
Dept: CARDIOLOGY | Facility: CLINIC | Age: 80
End: 2025-02-07
Payer: COMMERCIAL

## 2025-02-07 NOTE — TELEPHONE ENCOUNTER
Attempted to call patient with results of her monitor. She did not answer. When she calls back, please tell her it showed several short episodes of nonsustained AT which she has had before on a monitor. No significant abnormal rhythms. Likely due to her added stress recently. If she is still having them, we could try Flecainide after her stress test. Also, I would have her to get her Thyroid checked with her PCP to make sure she is not hyperthyroid.     Electronically signed by LIYAH Macias, 02/07/25, 2:29 PM EST.

## 2025-02-14 ENCOUNTER — DOCUMENTATION (OUTPATIENT)
Dept: CARDIOLOGY | Facility: CLINIC | Age: 80
End: 2025-02-14
Payer: COMMERCIAL

## 2025-02-14 NOTE — PROGRESS NOTES
Reviewed labwork from 2/3/2025. Free t4 was elevated and TSH low. PCP adjusted medication. Hopefully, her palpitations will improve with this adjustment.     Electronically signed by LIYAH Macias, 02/14/25, 3:44 PM EST.

## 2025-06-19 ENCOUNTER — HOSPITAL ENCOUNTER (OUTPATIENT)
Dept: CARDIOLOGY | Facility: HOSPITAL | Age: 80
Discharge: HOME | End: 2025-06-19
Admitting: PHYSICIAN ASSISTANT
Payer: COMMERCIAL

## 2025-06-19 VITALS — WEIGHT: 155.42 LBS | BODY MASS INDEX: 28.6 KG/M2 | HEIGHT: 62 IN

## 2025-06-19 DIAGNOSIS — I47.10 SVT (SUPRAVENTRICULAR TACHYCARDIA): ICD-10-CM

## 2025-06-19 DIAGNOSIS — I10 PRIMARY HYPERTENSION: ICD-10-CM

## 2025-06-19 LAB
AORTIC DIMENSIONLESS INDEX: 0.45 (DI)
AV MEAN PRESS GRAD SYS DOP V1V2: 11.7 MMHG
AV VMAX SYS DOP: 231.6 CM/SEC
BH CV ECHO MEAS - AI P1/2T: 812 MSEC
BH CV ECHO MEAS - AO MAX PG: 21.5 MMHG
BH CV ECHO MEAS - AO ROOT DIAM: 3.3 CM
BH CV ECHO MEAS - AO V2 VTI: 57.6 CM
BH CV ECHO MEAS - AVA(I,D): 1.48 CM2
BH CV ECHO MEAS - EDV(CUBED): 120.8 ML
BH CV ECHO MEAS - EDV(MOD-SP2): 79.3 ML
BH CV ECHO MEAS - EDV(MOD-SP4): 80.6 ML
BH CV ECHO MEAS - EF(MOD-SP2): 61 %
BH CV ECHO MEAS - EF(MOD-SP4): 61.9 %
BH CV ECHO MEAS - ESV(CUBED): 27 ML
BH CV ECHO MEAS - ESV(MOD-SP2): 30.9 ML
BH CV ECHO MEAS - ESV(MOD-SP4): 30.7 ML
BH CV ECHO MEAS - FS: 39.3 %
BH CV ECHO MEAS - IVS/LVPW: 0.96 CM
BH CV ECHO MEAS - IVSD: 0.9 CM
BH CV ECHO MEAS - LA DIMENSION: 4.5 CM
BH CV ECHO MEAS - LAT PEAK E' VEL: 7.4 CM/SEC
BH CV ECHO MEAS - LV DIASTOLIC VOL/BSA (35-75): 47 CM2
BH CV ECHO MEAS - LV MASS(C)D: 160.4 GRAMS
BH CV ECHO MEAS - LV MAX PG: 4.2 MMHG
BH CV ECHO MEAS - LV MEAN PG: 2.37 MMHG
BH CV ECHO MEAS - LV SYSTOLIC VOL/BSA (12-30): 17.9 CM2
BH CV ECHO MEAS - LV V1 MAX: 102.8 CM/SEC
BH CV ECHO MEAS - LV V1 VTI: 26 CM
BH CV ECHO MEAS - LVIDD: 4.9 CM
BH CV ECHO MEAS - LVIDS: 3 CM
BH CV ECHO MEAS - LVOT AREA: 3.3 CM2
BH CV ECHO MEAS - LVOT DIAM: 2.05 CM
BH CV ECHO MEAS - LVPWD: 0.94 CM
BH CV ECHO MEAS - MED PEAK E' VEL: 5.2 CM/SEC
BH CV ECHO MEAS - MV A MAX VEL: 101.6 CM/SEC
BH CV ECHO MEAS - MV DEC SLOPE: 237.2 CM/SEC2
BH CV ECHO MEAS - MV DEC TIME: 0.37 SEC
BH CV ECHO MEAS - MV E MAX VEL: 98.5 CM/SEC
BH CV ECHO MEAS - MV E/A: 0.97
BH CV ECHO MEAS - MV MAX PG: 4 MMHG
BH CV ECHO MEAS - MV MEAN PG: 1.97 MMHG
BH CV ECHO MEAS - MV P1/2T: 123.4 MSEC
BH CV ECHO MEAS - MV V2 VTI: 42.2 CM
BH CV ECHO MEAS - MVA(P1/2T): 1.78 CM2
BH CV ECHO MEAS - MVA(VTI): 2.02 CM2
BH CV ECHO MEAS - PA ACC TIME: 0.12 SEC
BH CV ECHO MEAS - PI END-D VEL: 96 CM/SEC
BH CV ECHO MEAS - RAP SYSTOLE: 3 MMHG
BH CV ECHO MEAS - RVSP: 36 MMHG
BH CV ECHO MEAS - SV(LVOT): 85.5 ML
BH CV ECHO MEAS - SV(MOD-SP2): 48.4 ML
BH CV ECHO MEAS - SV(MOD-SP4): 49.9 ML
BH CV ECHO MEAS - SVI(LVOT): 49.8 ML/M2
BH CV ECHO MEAS - SVI(MOD-SP2): 28.2 ML/M2
BH CV ECHO MEAS - SVI(MOD-SP4): 29.1 ML/M2
BH CV ECHO MEAS - TAPSE (>1.6): 2.44 CM
BH CV ECHO MEAS - TR MAX PG: 33.1 MMHG
BH CV ECHO MEAS - TR MAX VEL: 287.2 CM/SEC
BH CV ECHO MEASUREMENTS AVERAGE E/E' RATIO: 15.63
BH CV VAS BP RIGHT ARM: NORMAL MMHG
BH CV XLRA - RV BASE: 3.7 CM
BH CV XLRA - RV LENGTH: 6.9 CM
BH CV XLRA - RV MID: 3 CM
BH CV XLRA - TDI S': 12.1 CM/SEC
IVRT: 102 MS
LEFT ATRIUM VOLUME INDEX: 42.8 ML/M2
LV EF 2D ECHO EST: 62 %
LV EF BIPLANE MOD: 61.6 %

## 2025-06-19 PROCEDURE — 93306 TTE W/DOPPLER COMPLETE: CPT

## (undated) DEVICE — SUT MNCRYL PLS ANTIB UD 4/0 PS2 18IN

## (undated) DEVICE — PK LAP LASR CHOLE 10

## (undated) DEVICE — ENDOCUT SCISSOR TIP, DISPOSABLE: Brand: RENEW

## (undated) DEVICE — [HIGH FLOW INSUFFLATOR,  DO NOT USE IF PACKAGE IS DAMAGED,  KEEP DRY,  KEEP AWAY FROM SUNLIGHT,  PROTECT FROM HEAT AND RADIOACTIVE SOURCES.]: Brand: PNEUMOSURE

## (undated) DEVICE — APPL CHLORAPREP W/TINT 26ML BLU

## (undated) DEVICE — ANTIBACTERIAL UNDYED BRAIDED (POLYGLACTIN 910), SYNTHETIC ABSORBABLE SURGICAL SUTURE: Brand: COATED VICRYL

## (undated) DEVICE — GLV SURG SENSICARE PI ORTHO SZ7.5 LF STRL

## (undated) DEVICE — ENDOPATH XCEL BLADELESS TROCARS WITH STABILITY SLEEVES: Brand: ENDOPATH XCEL

## (undated) DEVICE — ENDOPATH XCEL BLUNT TIP TROCARS WITH SMOOTH SLEEVES: Brand: ENDOPATH XCEL

## (undated) DEVICE — ENDOPATH XCEL UNIVERSAL TROCAR STABLILITY SLEEVES: Brand: ENDOPATH XCEL

## (undated) DEVICE — LAPAROSCOPIC SMOKE FILTRATION SYSTEM: Brand: PALL LAPAROSHIELD® PLUS LAPAROSCOPIC SMOKE FILTRATION SYSTEM

## (undated) DEVICE — ENDOPOUCH RETRIEVER SPECIMEN RETRIEVAL BAGS: Brand: ENDOPOUCH RETRIEVER